# Patient Record
Sex: MALE | Race: WHITE | NOT HISPANIC OR LATINO | Employment: STUDENT | URBAN - METROPOLITAN AREA
[De-identification: names, ages, dates, MRNs, and addresses within clinical notes are randomized per-mention and may not be internally consistent; named-entity substitution may affect disease eponyms.]

---

## 2021-06-04 ENCOUNTER — IMMUNIZATION (OUTPATIENT)
Dept: FAMILY PLANNING/WOMEN'S HEALTH CLINIC | Facility: IMMUNIZATION CENTER | Age: 36
End: 2021-06-04
Attending: INTERNAL MEDICINE
Payer: MEDICARE

## 2021-06-04 DIAGNOSIS — Z23 ENCOUNTER FOR VACCINATION: Primary | ICD-10-CM

## 2021-06-04 PROCEDURE — 0002A PFIZER SARS-COV-2 VACCINE: CPT | Performed by: NURSE PRACTITIONER

## 2021-06-04 PROCEDURE — 91300 PFIZER SARS-COV-2 VACCINE: CPT | Performed by: NURSE PRACTITIONER

## 2022-02-15 ENCOUNTER — OFFICE VISIT (OUTPATIENT)
Dept: SLEEP MEDICINE | Facility: MEDICAL CENTER | Age: 37
End: 2022-02-15
Payer: MEDICARE

## 2022-02-15 VITALS
WEIGHT: 177 LBS | BODY MASS INDEX: 26.83 KG/M2 | SYSTOLIC BLOOD PRESSURE: 146 MMHG | DIASTOLIC BLOOD PRESSURE: 82 MMHG | HEIGHT: 68 IN | HEART RATE: 94 BPM | OXYGEN SATURATION: 98 % | RESPIRATION RATE: 16 BRPM

## 2022-02-15 DIAGNOSIS — G47.30 SLEEP DISORDER BREATHING: ICD-10-CM

## 2022-02-15 DIAGNOSIS — G47.19 EXCESSIVE DAYTIME SLEEPINESS: ICD-10-CM

## 2022-02-15 DIAGNOSIS — G47.419 NARCOLEPSY WITHOUT CATAPLEXY: Primary | ICD-10-CM

## 2022-02-15 PROBLEM — I31.9 CHRONIC PERICARDITIS: Status: ACTIVE | Noted: 2022-02-15

## 2022-02-15 PROBLEM — K21.9 GASTROESOPHAGEAL REFLUX: Status: ACTIVE | Noted: 2022-02-15

## 2022-02-15 PROBLEM — G80.1 SPASTIC CEREBRAL PALSY (HCC): Status: ACTIVE | Noted: 2022-02-15

## 2022-02-15 PROCEDURE — 99204 OFFICE O/P NEW MOD 45 MIN: CPT | Performed by: STUDENT IN AN ORGANIZED HEALTH CARE EDUCATION/TRAINING PROGRAM

## 2022-02-15 RX ORDER — OXYCODONE AND ACETAMINOPHEN 7.5; 325 MG/1; MG/1
TABLET ORAL
COMMUNITY
Start: 2022-01-18

## 2022-02-15 ASSESSMENT — PATIENT HEALTH QUESTIONNAIRE - PHQ9: CLINICAL INTERPRETATION OF PHQ2 SCORE: 0

## 2022-02-15 NOTE — PROGRESS NOTES
[facility] Consult Note     Date: 2/15/2022 / Time: 2:15 PM      HISTORY OF PRESENT ILLNESS:     Donato Valle is a 36 y.o. 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  Presents to Sleep Clinic to establish care for management of narcolepsy without cataplexy and to discuss difficulty breathing at night.    He underwent a PSG and MSLT at outside facility with Dr. Townsend approximately 5 to 6 years ago.  At that time he was told he met the criteria for narcolepsy without cataplexy.  He was started on Nuvigil and Adderall which greatly improved his daytime sleepiness.  He was not told he had sleep apnea at that time.  He has had difficulty with pericarditis in the past and follows with cardiology.  He is on 24-hour supplemental oxygen 2 L/min due to his cardiac history.    He was previously cared for through Neeraj Babin's office who has been managing his narcolepsy without cataplexy.  Due to insurance changes their office was no longer taking insurance.    He is currently taking Adderall 12.5 mg 3 times daily and Nuvigil 250 mg daily.  With this regimen he finds his sleepiness is greatly improved.  He will have a sensation that his medication is wearing off around mid to late afternoon approximately 5 to 6 hours after taking medication.  He does not nap during the day.  Denies any drowsiness while driving while on medication.    He does wake up approximately 1-2 times a month with sleep paralysis.    His sleep schedule does vary some with his bedtime usually between 10 and midnight and his wake time varies between 8 AM and 11 AM.  Feels he cannot nap as he has trouble with awakening from a nap.  In the mornings he has trouble waking up on his own.    One of his main concerns is over the last several months he has noticed he will awaken at  "night and his oxygen will be off.  When he awakens at these episodes he will gasp for air as he feels he is not getting enough air.  He has been told in the past that he has a slight snore in his sleep.  He lives alone and does not share the bed with anyone to tell him any updated information regarding behaviors in sleep.    He does take chronic opioid medication due to pain.    As per supplemental questionnaire to be scanned or imported into chart:    Franklin Sleepiness Score: 9    Sleep Schedule  Bedtime: Weekday & Weekend 10-12am   Wake time: Weekday & Weekend 8-11am   Sleep-onset latency: 30 min to 1hr   Awakenings from sleep: 0-1 sometimes has to use restroom  Difficulty falling back asleep: none   Bedroom partner: none   Naps: No scared if naps will not wake up for hours     DAYTIME SYMPTOMS:   Excessive daytime sleepiness: Yes sometimes   Daytime fatigue: Yes  Difficulty concentrating: Yes  Memory problems: Yes  Irritability:Yes  Work/school performance issues: No   Sleepiness with driving: No   Caffeine/stimulant use: Yes coffee in morning 1-2 cups in morning   Alcohol use:No     SLEEP RELATED SYMPTOMS  Snoring: No , has been told snores slightly in the past.   Witnessed apnea or gasping/choking: Yes gasping without oxygen  Dry mouth or mouth breathing: No   Sweating: Yes  Teeth grinding/biting: No   Morning headaches: Yes  Refreshed Upon Awakening: No      SLEEP RELATED BEHAVIORS:  Parasomnias (walking, talking, eating, violence): No   Leg kicking: No   Restless legs - \"urge to move\": No   Nightmares: No  Recurrent: No   Dream enactment: No      NARCOLEPSY:  Cataplexy: No   Sleep paralysis: Yes 1-2 times a month   Sleep attacks: Yes with cleaning house wakes up on floor   Hypnagogic/hypnopompic hallucinations: No     MEDICAL HISTORY  Past Medical History:   Diagnosis Date   • ASTHMA    • Cerebral palsy (HCC)    • GERD (gastroesophageal reflux disease)    • Narcolepsy    • Pain     cerebral palsy,   • " Unspecified disorder of thyroid         SURGICAL HISTORY  Past Surgical History:   Procedure Laterality Date   • PUMP INSERT/REMOVE Left 10/7/2015    Procedure: PUMP INSERT--Baclofen;  Surgeon: Yifan Sinclair;  Location: SURGERY [facility];  Service:    • NEURO DEST FACET C/T W/IG SNGL Left 2015    Procedure: LEFT T11-12 FACET JOINT NERVE ABLATIONFLUORO, PRONE, RF PROTOCOL;  Surgeon: Gino Gan D.O.;  Location: SURGERY [facility];  Service: Pain Management   • NEURO DEST FACET C/T W/IG ADDL  2015    Procedure: NEURO DEST FACET C/T W/IG ADDL;  Surgeon: Gino Gan D.O.;  Location: SURGERY [facility];  Service: Pain Management   • NEURO DEST FACET L/S W/IG SNGL  2015    Procedure: NEURO DEST FACET L/S W/IG SNGL;  Surgeon: Gino Gan D.O.;  Location: SURGERY [facility];  Service: Pain Management        FAMILY HISTORY  Family History   Problem Relation Age of Onset   • Sleep Apnea Maternal Uncle        SOCIAL HISTORY  Social History     Socioeconomic History   • Marital status: Single     Spouse name: Not on file   • Number of children: Not on file   • Years of education: Not on file   • Highest education level: Not on file   Occupational History   • Not on file   Tobacco Use   • Smoking status: Former Smoker     Packs/day: 0.50     Years: 14.00     Pack years: 7.00     Types: Cigarettes     Quit date: 2008     Years since quittin.1   • Smokeless tobacco: Former User     Quit date: 2015   Vaping Use   • Vaping Use: Never used   Substance and Sexual Activity   • Alcohol use: Not Currently     Alcohol/week: 1.8 oz     Types: 3 Standard drinks or equivalent per week   • Drug use: Yes     Types: Marijuana, Inhaled     Comment: daily medical marijiana   • Sexual activity: Not on file   Other Topics Concern   • Not on file   Social History Narrative   • Not on file     Social Determinants of Health     Financial Resource Strain:    • Difficulty of Paying  Living Expenses: Not on file   Food Insecurity:    • Worried About Running Out of Food in the Last Year: Not on file   • Ran Out of Food in the Last Year: Not on file   Transportation Needs:    • Lack of Transportation (Medical): Not on file   • Lack of Transportation (Non-Medical): Not on file   Physical Activity:    • Days of Exercise per Week: Not on file   • Minutes of Exercise per Session: Not on file   Stress:    • Feeling of Stress : Not on file   Social Connections:    • Frequency of Communication with Friends and Family: Not on file   • Frequency of Social Gatherings with Friends and Family: Not on file   • Attends Sikhism Services: Not on file   • Active Member of Clubs or Organizations: Not on file   • Attends Club or Organization Meetings: Not on file   • Marital Status: Not on file   Intimate Partner Violence:    • Fear of Current or Ex-Partner: Not on file   • Emotionally Abused: Not on file   • Physically Abused: Not on file   • Sexually Abused: Not on file   Housing Stability:    • Unable to Pay for Housing in the Last Year: Not on file   • Number of Places Lived in the Last Year: Not on file   • Unstable Housing in the Last Year: Not on file        Occupation: [employer]     CURRENT MEDICATIONS  Current Outpatient Medications   Medication Sig Dispense Refill   • ondansetron (ZOFRAN ODT) 4 MG TABLET DISPERSIBLE Take 4 mg by mouth every 8 hours as needed for Nausea/Vomiting.     • NON SPECIFIED Oxygen 2L N/C at noc     • pantoprazole (PROTONIX) 40 MG Tablet Delayed Response Take 40 mg by mouth every day.     • Albuterol Sulfate (PROAIR HFA INH) Inhale  by mouth as needed.     • lidocaine (XYLOCAINE) 5 % Ointment Apply  to affected area(s) as needed.     • diclofenac epolamine (FLECTOR) 1.3 % Patch Apply 1 Patch to skin as directed as needed.     • testosterone (TESTIM,ANDROGEL) 50 MG/5GM (1%) GEL gel Apply 5 g to skin as directed 2 Times a Day.     • levothyroxine (SYNTHROID) 100 MCG TABS Take 100  "mcg by mouth Every morning on an empty stomach.     • hydrocodone/acetaminophen (NORCO)  MG TABS Take 1-2 Tabs by mouth every 6 hours as needed.     • methocarbamol (ROBAXIN) 750 MG TABS Take 750 mg by mouth 3 times a day.     • gabapentin (NEURONTIN) 300 MG CAPS Take 300 mg by mouth 3 times a day.     • Armodafinil (NUVIGIL) 200 MG TABS Take 250 mg by mouth every day.     • vitamin D, Ergocalciferol, (DRISDOL) 54339 UNITS CAPS capsule Take  by mouth every 7 days.     • amphetamine-dextroamphetamine (ADDERALL) 20 MG TABS Take 20 mg by mouth 3 times a day.     • ranitidine (ZANTAC) 150 MG TABS Take 150 mg by mouth 2 times a day. (Patient not taking: Reported on 2/15/2022)       No current facility-administered medications for this visit.       REVIEW OF SYSTEMS  Constitutional: Denies fevers, Denies weight changes  Ears/Nose/Throat/Mouth: Denies nasal congestion or sore throat   Cardiovascular: Denies chest pain  Respiratory: Denies shortness of breath, Denies cough  Gastrointestinal/Hepatic: Denies nausea, vomiting  Sleep: see HPI    Physical Examination:  Vitals/ General Appearance:   Weight/BMI: Body mass index is 26.91 kg/m².  /82 (BP Location: Right arm, Patient Position: Sitting, BP Cuff Size: Adult)   Pulse 94   Resp 16   Ht 1.727 m (5' 8\")   Wt 80.3 kg (177 lb)   SpO2 98%   Vitals:    02/15/22 1404   BP: 146/82   BP Location: Right arm   Patient Position: Sitting   BP Cuff Size: Adult   Pulse: 94   Resp: 16   SpO2: 98%   Weight: 80.3 kg (177 lb)   Height: 1.727 m (5' 8\")       Pt. is alert and oriented to time, place and person. Cooperative and in no apparent distress.     Constitutional: Alert, no distress, well-groomed.  Skin: No rashes in visible areas.  Eye: Round. Conjunctiva clear, lids normal. No icterus.   ENT EXAM  Nasal alae/valves collapsible: No   Nasal septum deviation: Yes  Nasal turbinate hypertrophy: Left: Grade 1   Right: Grade 1  Hard palate narrow: No   Hard palate high: No "   Soft palate/uvula (Mallampati score): 3  Tongue Scalloping: Yes  Retrognathia: No   Micrognathia: No   Cardiovascular:no murmus/gallops/rubs, normal S1 and S2 heart sounds, regular rate and rhythm  Pulmonary:Clear to auscultation, No wheezes, No crackles.  Neurologic:Awake, alert and oriented x 3, No involuntary motions.  Extremities: No clubbing, cyanosis, or edema       ASSESSMENT AND PLAN   Donato Valle is a 36 y.o. 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  Presents to Sleep Clinic to establish care for management of narcolepsy without cataplexy and to discuss difficulty breathing at night.    1. Donato Valle  has symptoms of Sleep Apnea. Donato Valle has symptoms of snoring, gasping during sleep, morning headaches, unrefreshed upon awakening. These  interfere with activities of daily living.     Pt has risk factors for sleep apnea include chronic opioid medication, narcolepsy, and crowded oropharynx.       The pathophysiology of JENNIFER and the increased risk of cardiovascular morbidity from untreated JENNIFER is discussed in detail with the patient. He  also has excessive daytime sleepiness, GERD, chronic pain which can be worsened by JENNIFER.      We have discussed diagnostic options including in-laboratory, attended polysomnography and home sleep testing. We have also discussed treatment options including airway pressurization, reconstructive otolaryngologic surgery, dental appliances and weight management.     Subsequently,treatment options will be discussed based on the diagnostic study. Meanwhile, He is urged to avoid supine sleep, weight gain and alcoholic beverages since all of these can worsen JENNIFER. He is cautioned against drowsy driving. If He feels sleepy while driving, advised must pull over for a break/nap, rather than persist on  the road, in the interest of Pt's own safety and that of others on the road.    Plan  -  He  will be scheduled for an overnight PSG to assess sleep related breathing disorder.  - Follow up 1-2 weeks after sleep study to discuss results and treatment options moving forward   -Advised to reach out via Memamphart or by phone with any questions or concerns.     2.  Narcolepsy without cataplexy  Reports diagnosis occurred 5 to 6 years ago at outside facility Dr. Townsend's office.  Release information signed to try to get records from previous practitioner who is managing his narcolepsy without cataplexy Neeraj Babin.  We do not have records of previous MSLT or PSG testing.  Currently taking 12.5 mg Adderall 3 times a day and Nuvigil 250 mg daily.    Plan  -We will schedule MSLT to be done 2 weeks after stopping stimulant medication  -Await records from outside facility  -Encouraged to keep regular sleep schedule with same wake time and bedtime.  -Advised to avoid driving if drowsy if too sleepy to drive avoid driving.  Recommended if he notes he has longer drive may take a short nap prior to driving  -May consider taking 20 to 30-minute naps during the day when off medication to help with sleepiness  -Discussed importance of knowing limitations in terms of sleepiness when not medicated.  -Advised to reach out via Memamphart with any questions    Regarding treatment of other past medical problems and general health maintenance,  Pt is urged to follow up with PCP.      Please note portions of this record was created using voice recognition software. I have made every reasonable attempt to correct obvious errors, but I expect that there are errors of grammar and possibly content I did not discover before finalizing the note.

## 2022-03-22 ENCOUNTER — SLEEP STUDY (OUTPATIENT)
Dept: SLEEP MEDICINE | Facility: MEDICAL CENTER | Age: 37
End: 2022-03-22
Attending: STUDENT IN AN ORGANIZED HEALTH CARE EDUCATION/TRAINING PROGRAM
Payer: MEDICARE

## 2022-03-22 DIAGNOSIS — G47.30 SLEEP DISORDER BREATHING: ICD-10-CM

## 2022-03-22 DIAGNOSIS — G47.419 NARCOLEPSY WITHOUT CATAPLEXY: ICD-10-CM

## 2022-03-22 DIAGNOSIS — G47.19 EXCESSIVE DAYTIME SLEEPINESS: ICD-10-CM

## 2022-03-22 PROCEDURE — 95810 POLYSOM 6/> YRS 4/> PARAM: CPT | Performed by: STUDENT IN AN ORGANIZED HEALTH CARE EDUCATION/TRAINING PROGRAM

## 2022-03-23 ENCOUNTER — SLEEP STUDY (OUTPATIENT)
Dept: SLEEP MEDICINE | Facility: MEDICAL CENTER | Age: 37
End: 2022-03-23
Attending: STUDENT IN AN ORGANIZED HEALTH CARE EDUCATION/TRAINING PROGRAM
Payer: MEDICARE

## 2022-03-23 DIAGNOSIS — G47.419 NARCOLEPSY WITHOUT CATAPLEXY: ICD-10-CM

## 2022-03-23 DIAGNOSIS — G47.19 EXCESSIVE DAYTIME SLEEPINESS: ICD-10-CM

## 2022-03-23 DIAGNOSIS — G47.30 SLEEP DISORDER BREATHING: ICD-10-CM

## 2022-03-23 PROCEDURE — 95805 MULTIPLE SLEEP LATENCY TEST: CPT | Performed by: STUDENT IN AN ORGANIZED HEALTH CARE EDUCATION/TRAINING PROGRAM

## 2022-03-24 NOTE — PROCEDURES
MONTAGE: Standard  STUDY TYPE: Diagnostic    RECORDING TECHNIQUE:   After the scalp was prepared, gold plated electrodes were applied to the scalp according to the International 10-20 System. EEG (electroencephalogram) was continuously monitored from the O1-M2, O2-M1, C3-M2, C4-M1, F3-M2, and F4-M1. EOGs (electrooculograms) were monitored by electrodes placed at the left and right outer canthi. Chin EMG (electromyogram) was monitored by electrodes placed on the mentalis and sub-mentalis muscles. Nasal and oral airflow were monitored using a triple port thermocouple as well as oronasal pressure transducer. Respiratory effort was measured by inductive plethysmography technology employing abdominal and thoracic belts. Blood oxygen saturation and pulse were monitored by pulse oximetry. Heart rhythm was monitored by surface electrocardiogram. Leg EMG was studied using surface electrodes placed on left and right anterior tibialis. A microphone was used to monitor tracheal sounds and snoring. Body position was monitored and documented by technician observation.     SCORING CRITERIA:   A modification of the AASM manual for scoring of sleep and associated events was used. Obstructive apneas were scored by cessation of airflow for at least 10 seconds with continuing respiratory effort. Central apneas were scored by cessation of airflow for at least 10 seconds with no respiratory effort. Hypopneas were scored by a 30% or more reduction in airflow for at least 10 seconds accompanied by arterial oxygen desaturation of 3% or an arousal. For CMS (Medicare) patients, per AASM rule 1B, hypopneas are scored by 30% with mild reduction in airflow for at least 10 seconds accompanied by arterial saturation decreased at 4%.    Study start time was 11:02:55 PM. Diagnostic recording time was 7h 50.5m with a total sleep time of 6h 17.5m resulting in a sleep efficiency of 80.23%%. Sleep latency from the start of the study was 10 minutes and  the latency from sleep to REM was 12 minutes. In total,66 arousals were scored for an arousal index of 10.5.    Respiratory:  There were a total of 11 apneas consisting of 2 obstructive apneas, 0 mixed apneas, and 9 central apneas. A total of 24 hypopneas were scored. The apnea index was 1.75 per hour and the hypopnea index was 3.81 per hour resulting in an overall AHI of 5.56. AHI during rem was 7.4 and AHI while supine was 0.00.  Oximetry:  There was a mean oxygen saturation of 96.0%. The minimum oxygen saturation in NREM was 86.0 % and in REM was 91.0%. The patient spent 18.7 minutes of TST with SaO2 <88%.  Cardiac:  The highest heart rate seen while awake was 82 BPM while the highest heart rate during sleep was 80 BPM with an average sleeping heart rate of 60 BPM.  Limb Movements:  There were a total of 89 PLMs during sleep which resulted in a PLMS index of 14.1. Of these, 10 were associated with arousals which resulted in a PLMS arousal index of 1.6.    Impression:  1.  Mild obstructive sleep apnea overall AHI of 5.6   2.  Nocturnal hypoxia likely secondary to untreated sleep apnea minimum oxygen saturation of 86% time below or at 88% saturation of 18.7 minutes  3.  Sleep latency was 10 minutes and REM latency was 12 minutes, meets criteria for SOREM  4.  PLM's noted    Recommendations:  I recommend that the patient should return for a CPAP/BiPAP titration.  May consider empiric CPAP therapy.      In some cases alternative treatment options may be proven effective in resolving sleep apnea. These options include upper airway surgery, the use of a dental orthotic, weight loss orpositional therapy. Clinical correlation is required. In general patients with sleep apnea are advised to avoid alcohol, sedatives and not to operate a motor vehicle while drowsy.  Untreated sleep apnea increases the risk for cardiovascular and neurovascular disease.

## 2022-04-06 NOTE — PROCEDURES
Mean sleep latency testing        Recording Technique:  EEG was continuously moniotred from O1-M2,O2-M1,C3-M2,C3-M2,C4-M1,F3-M2 AND F4-M1. The EOGs and EMG was monitored, heart rhythm was monitored by EKG.      Scoring  Nap1: Sleep latency: 3.5 min, yes SOREMP  Nap2: Sleep latency: 1.0 min, yes SOREMP  Nap3: Sleep latency: 2.0 min, yes SOREMP  Nap4: Sleep latency: 1.5 min, No SOREMP  Nap5: Sleep latency: 3.0 min, No SOREMP    Mean Sleep latency: 2.2 min      Total # of SOREMPs: 3       Interpretation:    PS) Mild obstructive sleep apnea (JENNIFER) with apnea+hypopnea index (AHI) of 5.6/hour. The O2 sat luan was 86%.  Sleep latency 10 minutes, sleep onset REM period present with a latency of 12.5 minutes.    MSLT  1. The Multiple Sleep Latency Test (MSLT) was done on the next day with 5 naps. There was evidence of hypersomnolence with a short, 5-nap mean sleep latency of 2.2 min.    3 sleep onset REM periods (SOREMPs) were seen to suggest narcolepsy.      Further clinical correlation is recommended.         Patient did not submit a sleep log for the two weeks preceding the MSLT.      Urine drug screen on the morning of the MSLT was not done prior to MSLT.     Patient meets diagnostic criteria for narcolepsy with a mean sleep latency of 2.2 minutes.  In addition had 3 sleep onset REM periods during MSLT testing and 1 sleep onset REM period during prior night PSG testing.  Patient did meet criteria for mild obstructive sleep apnea however this should have very minimal impact sleep latency and sleep onset REM periods during MSLT testing.

## 2022-04-08 ENCOUNTER — OFFICE VISIT (OUTPATIENT)
Dept: SLEEP MEDICINE | Facility: MEDICAL CENTER | Age: 37
End: 2022-04-08
Payer: MEDICARE

## 2022-04-08 VITALS
WEIGHT: 176 LBS | HEART RATE: 94 BPM | OXYGEN SATURATION: 97 % | SYSTOLIC BLOOD PRESSURE: 122 MMHG | BODY MASS INDEX: 26.67 KG/M2 | DIASTOLIC BLOOD PRESSURE: 82 MMHG | RESPIRATION RATE: 14 BRPM | HEIGHT: 68 IN

## 2022-04-08 DIAGNOSIS — G47.419 NARCOLEPSY WITHOUT CATAPLEXY: Primary | ICD-10-CM

## 2022-04-08 DIAGNOSIS — G47.33 OSA (OBSTRUCTIVE SLEEP APNEA): ICD-10-CM

## 2022-04-08 PROCEDURE — 99214 OFFICE O/P EST MOD 30 MIN: CPT | Performed by: STUDENT IN AN ORGANIZED HEALTH CARE EDUCATION/TRAINING PROGRAM

## 2022-04-08 RX ORDER — DEXTROAMPHETAMINE SACCHARATE, AMPHETAMINE ASPARTATE, DEXTROAMPHETAMINE SULFATE AND AMPHETAMINE SULFATE 3.125; 3.125; 3.125; 3.125 MG/1; MG/1; MG/1; MG/1
12.5 TABLET ORAL 3 TIMES DAILY PRN
Qty: 90 TABLET | Refills: 0 | Status: SHIPPED | OUTPATIENT
Start: 2022-05-03 | End: 2022-06-03 | Stop reason: SDUPTHER

## 2022-04-08 RX ORDER — ARMODAFINIL 200 MG/1
250 TABLET ORAL DAILY
Qty: 30 TABLET | Refills: 0 | Status: SHIPPED | OUTPATIENT
Start: 2022-04-08 | End: 2022-04-11

## 2022-04-08 NOTE — PROGRESS NOTES
[facility] Follow-up Visit    CC: Follow-up to discuss sleep study results      HPI:  Donato Valle is a 36 y.o.male  with Cerebral palsy, hypothyroidism, chronic pain, GERD, history of pericarditis, chronic respiratory failure into the supplemental oxygen, narcolepsy type II, and mild obstructive sleep apnea.  Presents today to discuss sleep study results.    He was able to go off his medications prior to PSG MSLT testing.  Continues to use supplemental oxygen 24 hours 2 L/min.    Continues to take Adderall 12.5 mg 3 times a day along with Nuvigil 250 mg daily.  With this regimen he finds his sleepiness is well controlled.  Continues to deny any cataplectic episodes.    Sleep History  Previously diagnosed outside facility Dr. Townsend's office around 2015 2016 which showed narcolepsy without cataplexy.  3/22/2022 PSG showed mild obstructive sleep apnea overall AHI of 5.6, minimum oxygen saturation 86%, time at or below 88% saturation of 18.7 minutes.  Sleep onset REM period was seen during PSG  3/23/2022 MSLT showed mean sleep latency of 2.2 minutes and 3 sleep onset REM periods were identified consistent with a diagnosis of narcolepsy.    Patient Active Problem List    Diagnosis Date Noted   • Chronic pericarditis 02/15/2022   • Gastroesophageal reflux 02/15/2022   • Spastic cerebral palsy (HCC) 02/15/2022   • Chronic pain 10/07/2015   • Lumbosacral spondylosis without myelopathy 05/08/2015       Past Medical History:   Diagnosis Date   • ASTHMA    • Cerebral palsy (HCC)    • GERD (gastroesophageal reflux disease)    • Narcolepsy    • Pain     cerebral palsy,   • Unspecified disorder of thyroid         Past Surgical History:   Procedure Laterality Date   • PUMP INSERT/REMOVE Left 10/7/2015    Procedure: PUMP INSERT--Baclofen;  Surgeon: Yifan Sinclair;  Location: SURGERY [facility];  Service:    • NEURO DEST FACET C/T W/IG SNGL Left 5/8/2015    Procedure: LEFT T11-12 FACET JOINT NERVE  ABLATIONFLUORO, PRONE, RF PROTOCOL;  Surgeon: Gino Gan D.O.;  Location: [facility];  Service: Pain Management   • NEURO DEST FACET C/T W/IG ADDL  2015    Procedure: NEURO DEST FACET C/T W/IG ADDL;  Surgeon: Gino Gan D.O.;  Location: [facility];  Service: Pain Management   • NEURO DEST FACET L/S W/IG SNGL  2015    Procedure: NEURO DEST FACET L/S W/IG SNGL;  Surgeon: Gino Gan D.O.;  Location: [facility];  Service: Pain Management       Family History   Problem Relation Age of Onset   • Sleep Apnea Maternal Uncle        Social History     Socioeconomic History   • Marital status: Single     Spouse name: Not on file   • Number of children: Not on file   • Years of education: Not on file   • Highest education level: Not on file   Occupational History   • Not on file   Tobacco Use   • Smoking status: Former Smoker     Packs/day: 0.50     Years: 14.00     Pack years: 7.00     Types: Cigarettes     Quit date: 2008     Years since quittin.2   • Smokeless tobacco: Former User     Quit date: 2015   Vaping Use   • Vaping Use: Never used   Substance and Sexual Activity   • Alcohol use: Not Currently     Alcohol/week: 1.8 oz     Types: 3 Standard drinks or equivalent per week   • Drug use: Yes     Types: Marijuana, Inhaled     Comment: daily medical marijiana   • Sexual activity: Not on file   Other Topics Concern   • Not on file   Social History Narrative   • Not on file     Social Determinants of Health     Financial Resource Strain: Not on file   Food Insecurity: Not on file   Transportation Needs: Not on file   Physical Activity: Not on file   Stress: Not on file   Social Connections: Not on file   Intimate Partner Violence: Not on file   Housing Stability: Not on file       Current Outpatient Medications   Medication Sig Dispense Refill   • [START ON 5/3/2022] amphetamine-dextroamphetamine (ADDERALL) 12.5 MG tablet Take 1 Tablet  "by mouth 3 times a day as needed (sleepiness) for up to 61 days. 90 Tablet 0   • Armodafinil 200 MG Tab Take 250 mg by mouth every day for 30 days. 30 Tablet 0   • oxyCODONE-acetaminophen (PERCOCET) 7.5-325 MG per tablet TAKE 1 ORAL TABLET FOUR TIMES A DAY. DNF UNTIL 01/18/2022- #120 FOR 30 DAYS     • ondansetron (ZOFRAN ODT) 4 MG TABLET DISPERSIBLE Take 4 mg by mouth every 8 hours as needed for Nausea/Vomiting.     • NON SPECIFIED Oxygen 2L N/C at noc     • pantoprazole (PROTONIX) 40 MG Tablet Delayed Response Take 40 mg by mouth every day.     • Albuterol Sulfate (PROAIR HFA INH) Inhale  by mouth as needed.     • lidocaine (XYLOCAINE) 5 % Ointment Apply  to affected area(s) as needed.     • testosterone (TESTIM,ANDROGEL) 50 MG/5GM (1%) GEL gel Apply 5 g to skin as directed 2 Times a Day.     • levothyroxine (SYNTHROID) 100 MCG TABS Take 100 mcg by mouth Every morning on an empty stomach.     • methocarbamol (ROBAXIN) 750 MG TABS Take 750 mg by mouth 3 times a day.     • gabapentin (NEURONTIN) 300 MG CAPS Take 300 mg by mouth 3 times a day.     • vitamin D, Ergocalciferol, (DRISDOL) 53384 UNITS CAPS capsule Take  by mouth every 7 days.       No current facility-administered medications for this visit.        ALLERGIES: Patient has no known allergies.    ROS  Constitutional: Denies fevers, Denies weight changes  Ears/Nose/Throat/Mouth: Denies nasal congestion or sore throat   Cardiovascular: Denies chest pain  Respiratory: Denies shortness of breath, Denies cough  Gastrointestinal/Hepatic: Denies nausea, vomiting  Sleep: see HPI      PHYSICAL EXAM  /82 (BP Location: Left arm, Patient Position: Sitting, BP Cuff Size: Adult)   Pulse 94   Resp 14   Ht 1.727 m (5' 8\")   Wt 79.8 kg (176 lb)   SpO2 97%   BMI 26.76 kg/m²   Appearance: Well-nourished, well-developed, no acute distress  Eyes:  No scleral icterus , EOMI  ENMT: masked  Musculoskeletal:  Grossly normal; gait and station normal; digits and nails " normal  Skin:  No rashes, petechiae, cyanosis  Neurologic: without focal signs; oriented to person, time, place, and purpose; judgement intact      Medical Decision Making   Assessment and Plan  Donato Valle is a 36 y.o.male  with Cerebral palsy, hypothyroidism, chronic pain, GERD, history of pericarditis, chronic respiratory failure into the supplemental oxygen, narcolepsy type II, and mild obstructive sleep apnea.  Presents today to discuss sleep study results.    The medical record was reviewed.    Obstructive sleep apnea   Reviewed recent PSG with patient showing an AHI of 5.6 and Min Oxygen saturation of 86%.  Time at or below 80% saturation of 18.7 minutes  Based on sleep study and symptoms meets criteria for mild obstructive sleep apnea.   We discussed the pathophysiology of obstructive sleep apnea (JENNIFER) and risk factors for the disease. We also discussed possible consequences of untreated JENNIFER, including excessive daytime sleepiness and fatigue, cognitive dysfunction, cardiovascular complications such as elevated blood pressure, heart attacks, cardiac arrhythmias, and strokes. We discussed how JENNIFER typically gets worse with age. We discussed treatment options for JENNIFER, including the gold standard therapy (PAP), alternative options such as a mandibular advancement device (custom-made oral appliances) and surgeries. We will proceed CPAP therapy.     RECOMMENDATIONS  -Start Auto CPAP at pressures 4-7 cm H2O  -Discussed importance of adherence/compliance   -Prescription generated for supplies   -Patient counseled to avoid driving when sleepy. Encouraged to anticipate sleepiness, consider taking a 10 min nap prior to driving, alternate with another , or pull over if sleepy while driving  -Advised to contact our office or myself with any questions via [email or web address]  -Follow up in 3 months or sooner if needed    Positive airway pressure will favorably impact many of the adverse conditions and effects provoked  by JENNIFER.    Narcolepsy type II  Reviewed recent PSG and MSLT results with patient showing findings consistent with narcolepsy with a mean sleep latency of 2.2 minutes and more than 2 sleep onset REM periods.  Symptoms have been previously well controlled with Adderall and Nuvigil.  We will plan to continue current regimen of medications.    Plan  -Continue Adderall 12.5 mg 3 times daily, renewed today to start in May  -Continue Nuvigil 250 mg daily, renewed today   -Advised to avoid driving if drowsy  -Advised to reach out via [email or web address] with any questions    Have advised the patient to follow up with the appropriate healthcare practitioners for all other medical problems and issues.    Return for 1-2 months after starting CPAP. .      Please note portions of this record was created using voice recognition software. I have made every reasonable attempt to correct obvious errors, but I expect that there are errors of grammar and possibly content I did not discover before finalizing the note.

## 2022-04-08 NOTE — PATIENT INSTRUCTIONS
"CPAP and BPAP Information  CPAP and BPAP are methods of helping a person breathe with the use of air pressure. CPAP stands for \"continuous positive airway pressure.\" BPAP stands for \"bi-level positive airway pressure.\" In both methods, air is blown through your nose or mouth and into your air passages to help you breathe well.  CPAP and BPAP use different amounts of pressure to blow air. With CPAP, the amount of pressure stays the same while you breathe in and out. With BPAP, the amount of pressure is increased when you breathe in (inhale) so that you can take larger breaths. Your health care provider will recommend whether CPAP or BPAP would be more helpful for you.  Why are CPAP and BPAP treatments used?  CPAP or BPAP can be helpful if you have:  · Sleep apnea.  · Chronic obstructive pulmonary disease (COPD).  · Heart failure.  · Medical conditions that weaken the muscles of the chest including muscular dystrophy, or neurological diseases such as amyotrophic lateral sclerosis (ALS).  · Other problems that cause breathing to be weak, abnormal, or difficult.  CPAP is most commonly used for obstructive sleep apnea (JENNIFER) to keep the airways from collapsing when the muscles relax during sleep.  How is CPAP or BPAP administered?  Both CPAP and BPAP are provided by a small machine with a flexible plastic tube that attaches to a plastic mask. You wear the mask. Air is blown through the mask into your nose or mouth. The amount of pressure that is used to blow the air can be adjusted on the machine. Your health care provider will determine the pressure setting that should be used based on your individual needs.  When should CPAP or BPAP be used?  In most cases, the mask only needs to be worn during sleep. Generally, the mask needs to be worn throughout the night and during any daytime naps. People with certain medical conditions may also need to wear the mask at other times when they are awake. Follow instructions from your " health care provider about when to use the machine.  What are some tips for using the mask?    · Because the mask needs to be snug, some people feel trapped or closed-in (claustrophobic) when first using the mask. If you feel this way, you may need to get used to the mask. One way to do this is by holding the mask loosely over your nose or mouth and then gradually applying the mask more snugly. You can also gradually increase the amount of time that you use the mask.  · Masks are available in various types and sizes. Some fit over your mouth and nose while others fit over just your nose. If your mask does not fit well, talk with your health care provider about getting a different one.  · If you are using a mask that fits over your nose and you tend to breathe through your mouth, a chin strap may be applied to help keep your mouth closed.  · The CPAP and BPAP machines have alarms that may sound if the mask comes off or develops a leak.  · If you have trouble with the mask, it is very important that you talk with your health care provider about finding a way to make the mask easier to tolerate. Do not stop using the mask. Stopping the use of the mask could have a negative impact on your health.  What are some tips for using the machine?  · Place your CPAP or BPAP machine on a secure table or stand near an electrical outlet.  · Know where the on/off switch is located on the machine.  · Follow instructions from your health care provider about how to set the pressure on your machine and when you should use it.  · Do not eat or drink while the CPAP or BPAP machine is on. Food or fluids could get pushed into your lungs by the pressure of the CPAP or BPAP.  · Do not smoke. Tobacco smoke residue can damage the machine.  · For home use, CPAP and BPAP machines can be rented or purchased through home health care companies. Many different brands of machines are available. Renting a machine before purchasing may help you find out  which particular machine works well for you.  · Keep the CPAP or BPAP machine and attachments clean. Ask your health care provider for specific instructions.  Get help right away if:  · You have redness or open areas around your nose or mouth where the mask fits.  · You have trouble using the CPAP or BPAP machine.  · You cannot tolerate wearing the CPAP or BPAP mask.  · You have pain, discomfort, and bloating in your abdomen.  Summary  · CPAP and BPAP are methods of helping a person breathe with the use of air pressure.  · Both CPAP and BPAP are provided by a small machine with a flexible plastic tube that attaches to a plastic mask.  · If you have trouble with the mask, it is very important that you talk with your health care provider about finding a way to make the mask easier to tolerate.  This information is not intended to replace advice given to you by your health care provider. Make sure you discuss any questions you have with your health care provider.  Document Released: 09/15/2005 Document Revised: 04/08/2020 Document Reviewed: 11/06/2017  Elsevier Patient Education © 2020 [employer]

## 2022-04-11 DIAGNOSIS — G47.419 NARCOLEPSY WITHOUT CATAPLEXY: Primary | ICD-10-CM

## 2022-04-11 RX ORDER — ARMODAFINIL 250 MG/1
250 TABLET ORAL
Qty: 30 TABLET | Refills: 1 | Status: SHIPPED | OUTPATIENT
Start: 2022-04-11 | End: 2022-05-11

## 2022-06-03 ENCOUNTER — PATIENT MESSAGE (OUTPATIENT)
Dept: SLEEP MEDICINE | Facility: MEDICAL CENTER | Age: 37
End: 2022-06-03
Payer: MEDICARE

## 2022-06-03 ENCOUNTER — TELEPHONE (OUTPATIENT)
Dept: SLEEP MEDICINE | Facility: MEDICAL CENTER | Age: 37
End: 2022-06-03

## 2022-06-03 DIAGNOSIS — G47.419 NARCOLEPSY WITHOUT CATAPLEXY: ICD-10-CM

## 2022-06-03 DIAGNOSIS — G47.419 NARCOLEPSY WITHOUT CATAPLEXY: Primary | ICD-10-CM

## 2022-06-03 RX ORDER — DEXTROAMPHETAMINE SACCHARATE, AMPHETAMINE ASPARTATE, DEXTROAMPHETAMINE SULFATE AND AMPHETAMINE SULFATE 3.125; 3.125; 3.125; 3.125 MG/1; MG/1; MG/1; MG/1
12.5 TABLET ORAL 3 TIMES DAILY PRN
Qty: 90 TABLET | Refills: 0 | Status: SHIPPED | OUTPATIENT
Start: 2022-06-03 | End: 2022-07-03 | Stop reason: SDUPTHER

## 2022-06-03 RX ORDER — ARMODAFINIL 250 MG/1
1 TABLET ORAL
Qty: 30 TABLET | Refills: 0 | Status: SHIPPED | OUTPATIENT
Start: 2022-06-03 | End: 2022-07-03 | Stop reason: SDUPTHER

## 2022-06-03 RX ORDER — DEXTROAMPHETAMINE SACCHARATE, AMPHETAMINE ASPARTATE, DEXTROAMPHETAMINE SULFATE AND AMPHETAMINE SULFATE 3.125; 3.125; 3.125; 3.125 MG/1; MG/1; MG/1; MG/1
12.5 TABLET ORAL 3 TIMES DAILY PRN
Qty: 90 TABLET | Refills: 0 | Status: SHIPPED | OUTPATIENT
Start: 2022-06-03 | End: 2022-06-03

## 2022-06-03 NOTE — TELEPHONE ENCOUNTER
Have we ever prescribed this med? Yes.  If yes, what date? Yes, 05/03/22    Last OV: 04/08/22    Next OV: None    DX: Narcolepsy without cataplexy (G47.419)    Medications: amphetamine-dextroamphetamine (ADDERALL) 12.5 MG tablet

## 2022-07-03 DIAGNOSIS — G47.419 NARCOLEPSY WITHOUT CATAPLEXY: ICD-10-CM

## 2022-07-06 RX ORDER — ARMODAFINIL 250 MG/1
1 TABLET ORAL
Qty: 30 TABLET | Refills: 0 | Status: SHIPPED | OUTPATIENT
Start: 2022-07-06 | End: 2022-08-02 | Stop reason: SDUPTHER

## 2022-07-06 RX ORDER — DEXTROAMPHETAMINE SACCHARATE, AMPHETAMINE ASPARTATE, DEXTROAMPHETAMINE SULFATE AND AMPHETAMINE SULFATE 3.125; 3.125; 3.125; 3.125 MG/1; MG/1; MG/1; MG/1
12.5 TABLET ORAL 3 TIMES DAILY PRN
Qty: 90 TABLET | Refills: 0 | Status: SHIPPED | OUTPATIENT
Start: 2022-07-06 | End: 2022-08-02 | Stop reason: SDUPTHER

## 2022-07-06 NOTE — TELEPHONE ENCOUNTER
Have we ever prescribed this med? Yes.  If yes, what date? Yes, 6/3/22    Last OV: 4/8/22    Next OV: None    DX: Narcolepsy without cataplexy (G47.419)    Medications: Armodafinil 250 MG Tab

## 2022-08-02 DIAGNOSIS — G47.419 NARCOLEPSY WITHOUT CATAPLEXY: ICD-10-CM

## 2022-08-02 NOTE — TELEPHONE ENCOUNTER
Have we ever prescribed this med? Yes.  If yes, what date? YES, 07/6/22    Last OV: 04/08/2022    Next OV: 08/04/22    DX: Narcolepsy without cataplexy (G47.419)    Medications: Armodafinil 250 MG Tab    Have we ever prescribed this med? Yes.  If yes, what date? Yes, 07/06/22    Last OV: 04/08/22    Next OV: 08/04/2022    DX: Narcolepsy without cataplexy (G47.419)    Medications: amphetamine-dextroamphetamine (ADDERALL) 12.5 MG tablet

## 2022-08-03 RX ORDER — DEXTROAMPHETAMINE SACCHARATE, AMPHETAMINE ASPARTATE, DEXTROAMPHETAMINE SULFATE AND AMPHETAMINE SULFATE 3.125; 3.125; 3.125; 3.125 MG/1; MG/1; MG/1; MG/1
12.5 TABLET ORAL 3 TIMES DAILY PRN
Qty: 90 TABLET | Refills: 0 | Status: SHIPPED | OUTPATIENT
Start: 2022-08-03 | End: 2022-09-02 | Stop reason: SDUPTHER

## 2022-08-03 RX ORDER — ARMODAFINIL 250 MG/1
1 TABLET ORAL
Qty: 30 TABLET | Refills: 0 | Status: SHIPPED | OUTPATIENT
Start: 2022-08-03 | End: 2022-09-02 | Stop reason: SDUPTHER

## 2022-08-04 ENCOUNTER — OFFICE VISIT (OUTPATIENT)
Dept: SLEEP MEDICINE | Facility: MEDICAL CENTER | Age: 37
End: 2022-08-04
Payer: MEDICARE

## 2022-08-04 VITALS
RESPIRATION RATE: 16 BRPM | DIASTOLIC BLOOD PRESSURE: 84 MMHG | BODY MASS INDEX: 26.07 KG/M2 | HEIGHT: 68 IN | SYSTOLIC BLOOD PRESSURE: 130 MMHG | HEART RATE: 82 BPM | WEIGHT: 172 LBS | OXYGEN SATURATION: 98 %

## 2022-08-04 DIAGNOSIS — G47.33 OSA (OBSTRUCTIVE SLEEP APNEA): ICD-10-CM

## 2022-08-04 DIAGNOSIS — G47.419 NARCOLEPSY WITHOUT CATAPLEXY: ICD-10-CM

## 2022-08-04 DIAGNOSIS — R06.2 WHEEZING: ICD-10-CM

## 2022-08-04 PROCEDURE — 99214 OFFICE O/P EST MOD 30 MIN: CPT | Performed by: NURSE PRACTITIONER

## 2022-08-04 RX ORDER — MOMETASONE FUROATE 100 UG/1
1 AEROSOL RESPIRATORY (INHALATION) 2 TIMES DAILY
Qty: 1 EACH | Refills: 0 | COMMUNITY
Start: 2022-08-04 | End: 2022-09-02 | Stop reason: SDUPTHER

## 2022-08-04 NOTE — PROGRESS NOTES
Chief Complaint   Patient presents with   • Follow-Up     1st compliance check   last seen on 4/8/2022 - Dr. Bhakta  Auto CPAP at pressures 4-7 cm        HPI:  Donato Valle is a 36 y.o. year old male here today for follow-up on JENNIFER and narcolepsy type II.  Patient presents today for first compliance on CPAP machine that was ordered at last visit on 4/8/2022.  Past medical history also includes pericarditis, chronic respiratory failure into the supplemental oxygen, narcolepsy type II, and mild obstructive sleep apnea.     Continues to take Adderall 12.5 mg 3 times a day along with Nuvigil 250 mg daily.  With this regimen he finds his sleepiness is well controlled.  Continues to deny any cataplectic episodes.    Patient is currently using a fullface mask and denies any difficulty with mask fit or pressures at this time.  He denies any problems falling or staying asleep.  As previously mentioned he is on Adderall and Nuvigil.  He states that he is still getting used to have to wear the mask before going to bed.  He is modifying his sleep structure to accommodate for CPAP use.  He denies any excessive daytime sleepiness, morning headaches, palpitations, concentration or memory problems.  Overall on CPAP he feels that his breathing is improved.  He is previously needing to wake up and use his albuterol due to coughing and wheezing.  That has improved significantly with CPAP use.  He is using CPAP with supplemental oxygen bleed in.  CPAP is set to auto CPAP at 4 to 7 cm/H2O.    Compliance was reviewed and does show 80% use with an average time of 7 hours and 8 minutes and a resultant AHI of 3.5 with no evidence of excessive leakage.    Sleep History  Previously diagnosed outside facility Dr. Townsend's office around 2015 2016 which showed narcolepsy without cataplexy.  3/22/2022 PSG showed mild obstructive sleep apnea overall AHI of 5.6, minimum oxygen saturation 86%, time at or below 88% saturation of 18.7 minutes.   Sleep onset REM period was seen during PSG  3/23/2022 MSLT showed mean sleep latency of 2.2 minutes and 3 sleep onset REM periods were identified consistent with a diagnosis of narcolepsy.    Patient also has a previous diagnosis of asthma, but is currently managed for this.  He states his symptoms are occasional cough, but mostly shortness of breath and wheezing.  He is currently only using albuterol and using it 3-4 times a day.  There is no evidence of PFTs to review.  She did have an exacerbation on 7/3/2022 for which she was given methylprednisone and duo nebs which improved his breathing significantly.  He would like to inquire about better management of asthma as he is not currently on a maintenance inhaler.  Is a former smoker who quit 14 years ago with a 7-pack-year history.    ROS: As per HPI and otherwise negative if not stated.    Past Medical History:   Diagnosis Date   • ASTHMA    • Cerebral palsy (HCC)    • GERD (gastroesophageal reflux disease)    • Narcolepsy    • Pain     cerebral palsy,   • Unspecified disorder of thyroid        Past Surgical History:   Procedure Laterality Date   • PUMP INSERT/REMOVE Left 10/7/2015    Procedure: PUMP INSERT--Baclofen;  Surgeon: Yifan Sinclair;  Location[facility];  Service:    • NEURO DEST FACET C/T W/IG SNGL Left 5/8/2015    Procedure: LEFT T11-12 FACET JOINT NERVE ABLATIONFLUORO, PRONE, RF PROTOCOL;  Surgeon: Gino Gan D.O.;  Location: [facility];  Service: Pain Management   • NEURO DEST FACET C/T W/IG ADDL  5/8/2015    Procedure: NEURO DEST FACET C/T W/IG ADDL;  Surgeon: Gino Gan D.O.;  Location: [facility];  Service: Pain Management   • NEURO DEST FACET L/S W/IG SNGL  5/8/2015    Procedure: NEURO DEST FACET L/S W/IG SNGL;  Surgeon: Gino Gan D.O.;  Location: [facility];  Service: Pain Management       Family History   Problem Relation Age of Onset   • Sleep Apnea Maternal  "Uncle        Allergies as of 08/04/2022   • (No Known Allergies)        Vitals:  /84 (BP Location: Left arm, Patient Position: Sitting, BP Cuff Size: Adult)   Pulse 82   Resp 16   Ht 1.727 m (5' 8\")   Wt 78 kg (172 lb)   SpO2 98%     Current medications as of today   Current Outpatient Medications   Medication Sig Dispense Refill   • Mometasone Furoate (ASMANEX HFA) 100 MCG/ACT Aerosol Inhale 1 Puff 2 times a day. 1 Each 0   • Armodafinil 250 MG Tab Take 1 Tablet by mouth 1 time a day as needed (sleepiness) for up to 30 days. 30 Tablet 0   • amphetamine-dextroamphetamine (ADDERALL) 12.5 MG tablet Take 1 Tablet by mouth 3 times a day as needed (sleepiness) for up to 30 days. 90 Tablet 0   • oxyCODONE-acetaminophen (PERCOCET) 7.5-325 MG per tablet TAKE 1 ORAL TABLET FOUR TIMES A DAY. DNF UNTIL 01/18/2022- #120 FOR 30 DAYS     • ondansetron (ZOFRAN ODT) 4 MG TABLET DISPERSIBLE Take 4 mg by mouth every 8 hours as needed for Nausea/Vomiting.     • NON SPECIFIED Oxygen 2L N/C at noc     • pantoprazole (PROTONIX) 40 MG Tablet Delayed Response Take 40 mg by mouth every day.     • Albuterol Sulfate (PROAIR HFA INH) Inhale  by mouth as needed.     • lidocaine (XYLOCAINE) 5 % Ointment Apply  to affected area(s) as needed.     • testosterone (TESTIM,ANDROGEL) 50 MG/5GM (1%) GEL gel Apply 5 g to skin as directed 2 Times a Day.     • levothyroxine (SYNTHROID) 100 MCG TABS Take 100 mcg by mouth Every morning on an empty stomach.     • methocarbamol (ROBAXIN) 750 MG TABS Take 750 mg by mouth 3 times a day.     • gabapentin (NEURONTIN) 300 MG CAPS Take 300 mg by mouth 3 times a day.     • vitamin D, Ergocalciferol, (DRISDOL) 97022 UNITS CAPS capsule Take  by mouth every 7 days.       No current facility-administered medications for this visit.         Physical Exam:   Gen:           Alert and oriented, No apparent distress. Mood and affect appropriate, normal interaction with examiner.  Eyes:          PERRL, EOM intact, " sclere white, conjunctive moist.  Ears:          Not examined.   Hearing:     Grossly intact.  Nose:          Normal, no lesions or deformities.  Dentition:    Good dentition.  Oropharynx:   Tongue normal, posterior pharynx without erythema or exudate.  Mallampati Classification:   Neck:        Supple, trachea midline, no masses.  Respiratory Effort: No intercostal retractions or use of accessory muscles.   Lung Auscultation:      Clear to auscultation bilaterally; no rales, rhonchi or wheezing.  CV:            Regular rate and rhythm. No murmurs, rubs or gallops.  Abd:           Not examined.   Lymphadenopathy: Not examined.  Gait and Station: Normal.  Digits and Nails: No clubbing, cyanosis, petechiae, or nodes.   Cranial Nerves: II-XII grossly intact.  Skin:        No rashes, lesions or ulcers noted.               Ext:           No cyanosis or edema.      Assessment:  1. Wheezing  Referral to Pulmonary and Sleep Medicine    PULMONARY FUNCTION TESTS -Test requested: Complete Pulmonary Function Test   2. JENNIFER (obstructive sleep apnea)     3. Narcolepsy without cataplexy         Plan:  1.  Referral placed for pulmonology.  Order placed for PFTs.  Reviewed ER note for exacerbation on 7/3/2022.  Patient currently only using albuterol as needed.  He states he needs it 3-4 times a day.  Today I am prescribing a sample of Flovent 100 mcg.  Patient will send a message to let me know if he finds benefit for Flovent and notices a decrease in his need for albuterol.  Can follow-up with patient for this after initial MD consultation.  2.  Patient is using and benefiting from CPAP therapy.  He notes overall improvement in energy levels.  Compliance was reviewed and shows adequate use and control of JENNIFER with a resultant AHI of 3.5.  Advised nightly use for optimal benefit.  Patient will follow-up with Dr. Bhakta for her narcolepsy and JENNIFER management.      Please note that this dictation was created using voice recognition  software. I have made every reasonable attempt to correct obvious errors, but it is possible there are errors of grammar and possibly content that I did not discover before finalizing the note.

## 2022-09-02 DIAGNOSIS — G47.419 NARCOLEPSY WITHOUT CATAPLEXY: ICD-10-CM

## 2022-09-02 NOTE — TELEPHONE ENCOUNTER
Have we ever prescribed this med? Yes.  If yes, what date? 08/03/22    Last OV: 04/08/22     Next OV: NONE    DX: Narcolepsy without cataplexy (G47.419)    Medications: Armodafinil 250 MG Tab

## 2022-09-02 NOTE — TELEPHONE ENCOUNTER
Have we ever prescribed this med? Yes.  If yes, what date? 8/3/2022    Last OV: 8/4/2022 - Shashank CASTELLANOS     Next OV: 1/30/2023 - Dr. Jones     DX: Narcolepsy without cataplexy (G47.419)    Medications: Armodafinil 250 MG Tab    Have we ever prescribed this med? Yes.  If yes, what date? 8/3/2022    Last OV: 8/4/2022 - Shashank CASTELLANOS     Next OV: 1/30/2023 - Dr. 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

## 2022-09-02 NOTE — TELEPHONE ENCOUNTER
Have we ever prescribed this med? Yes.  If yes, what date? 08/04/22    Last OV: 08/04/22 SCOTT Shoemaker APRN    Next OV: 1/30/2023 - Dr. Jones     DX:     Medications: Mometasone Furoate (ASMANEX HFA) 100 MCG/ACT Aerosol

## 2022-09-06 RX ORDER — ARMODAFINIL 250 MG/1
1 TABLET ORAL
Qty: 30 TABLET | Refills: 0 | Status: SHIPPED | OUTPATIENT
Start: 2022-09-06 | End: 2022-10-05 | Stop reason: SDUPTHER

## 2022-09-06 RX ORDER — DEXTROAMPHETAMINE SACCHARATE, AMPHETAMINE ASPARTATE, DEXTROAMPHETAMINE SULFATE AND AMPHETAMINE SULFATE 3.125; 3.125; 3.125; 3.125 MG/1; MG/1; MG/1; MG/1
12.5 TABLET ORAL 3 TIMES DAILY PRN
Qty: 90 TABLET | Refills: 0 | Status: SHIPPED | OUTPATIENT
Start: 2022-09-06 | End: 2022-10-05 | Stop reason: SDUPTHER

## 2022-09-06 RX ORDER — MOMETASONE FUROATE 100 UG/1
1 AEROSOL RESPIRATORY (INHALATION) 2 TIMES DAILY
Qty: 1 EACH | Refills: 0 | Status: SHIPPED | OUTPATIENT
Start: 2022-09-06 | End: 2022-10-05 | Stop reason: SDUPTHER

## 2022-10-05 DIAGNOSIS — G47.419 NARCOLEPSY WITHOUT CATAPLEXY: ICD-10-CM

## 2022-10-05 RX ORDER — DEXTROAMPHETAMINE SACCHARATE, AMPHETAMINE ASPARTATE, DEXTROAMPHETAMINE SULFATE AND AMPHETAMINE SULFATE 3.125; 3.125; 3.125; 3.125 MG/1; MG/1; MG/1; MG/1
12.5 TABLET ORAL 3 TIMES DAILY PRN
Qty: 90 TABLET | Refills: 0 | Status: SHIPPED | OUTPATIENT
Start: 2022-10-05 | End: 2022-11-02 | Stop reason: SDUPTHER

## 2022-10-05 RX ORDER — ARMODAFINIL 250 MG/1
1 TABLET ORAL
Qty: 30 TABLET | Refills: 0 | Status: SHIPPED | OUTPATIENT
Start: 2022-10-05 | End: 2022-11-02 | Stop reason: SDUPTHER

## 2022-10-05 NOTE — TELEPHONE ENCOUNTER
Have we ever prescribed this med? Yes.  If yes, what date? 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 Yes.  If yes, what date? 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

## 2022-10-25 ENCOUNTER — TELEPHONE (OUTPATIENT)
Dept: SLEEP MEDICINE | Facility: MEDICAL CENTER | Age: 37
End: 2022-10-25

## 2022-10-25 ENCOUNTER — NON-PROVIDER VISIT (OUTPATIENT)
Dept: SLEEP MEDICINE | Facility: MEDICAL CENTER | Age: 37
End: 2022-10-25
Attending: NURSE PRACTITIONER
Payer: MEDICARE

## 2022-10-25 VITALS — BODY MASS INDEX: 26.37 KG/M2 | HEIGHT: 68 IN | WEIGHT: 174 LBS

## 2022-10-25 DIAGNOSIS — R06.2 WHEEZING: ICD-10-CM

## 2022-10-25 PROCEDURE — 94726 PLETHYSMOGRAPHY LUNG VOLUMES: CPT | Performed by: STUDENT IN AN ORGANIZED HEALTH CARE EDUCATION/TRAINING PROGRAM

## 2022-10-25 PROCEDURE — 94060 EVALUATION OF WHEEZING: CPT | Performed by: STUDENT IN AN ORGANIZED HEALTH CARE EDUCATION/TRAINING PROGRAM

## 2022-10-25 PROCEDURE — 94729 DIFFUSING CAPACITY: CPT | Performed by: STUDENT IN AN ORGANIZED HEALTH CARE EDUCATION/TRAINING PROGRAM

## 2022-10-25 ASSESSMENT — PULMONARY FUNCTION TESTS
FEV1/FVC_PERCENT_LLN: 68
FEV1_LLN: 3.37
FEV1/FVC: 78
FEV1/FVC_PERCENT_PREDICTED: 94
FEV1_PERCENT_CHANGE: 7
FEV1/FVC_PREDICTED: 82
FVC: 4.16
FVC_PREDICTED: 4.96
FEV1/FVC: 75
FEV1_PERCENT_PREDICTED: 86
FVC_PERCENT_PREDICTED: 90
FEV1/FVC_PERCENT_CHANGE: 3
FEV1_PREDICTED: 4.04
FEV1/FVC: 75
FEV1/FVC_PERCENT_PREDICTED: 96
FEV1/FVC_PERCENT_PREDICTED: 91
FEV1_PERCENT_CHANGE: 11
FVC_PERCENT_PREDICTED: 84
FEV1_PERCENT_PREDICTED: 77
FEV1: 3.48
FVC: 4.48
FEV1/FVC_PERCENT_PREDICTED: 92
FEV1/FVC_PERCENT_CHANGE: 157
FVC_LLN: 4.14
FEV1/FVC_PERCENT_PREDICTED: 81
FEV1/FVC: 77.68
FEV1: 3.11

## 2022-10-25 ASSESSMENT — FIBROSIS 4 INDEX: FIB4 SCORE: 0.88

## 2022-10-25 NOTE — TELEPHONE ENCOUNTER
Pt has had a lot of improvement on Asmanex but he just found out it is not on his formulary anymore. He will need alternative RX to [facility] on [address] [place].

## 2022-10-25 NOTE — PROCEDURES
Technician: OTIS Mason    Technician Comment:  Good patient effort & cooperation.  The results of this test meet the ATS/ERS standards for acceptability & reproducibility.  Test was performed on the [employer] Body Plethysmograph-Elite DX system.  Predicted values were GLI-2012 for spirometry, GLI-2017 for DLCO, ITS for Lung Volumes.  The DLCO was uncorrected for Hgb.  A bronchodilator of Ventolin HFA -2puffs via spacer administered.  DLCO performed during dilation period.    Interpretation:     No obstruction or restriction.  There is a significant bronchodilator response.  The MVV is reduced disproportionately to the degree of reduction in FEV1.  The reduced MVV may reflect suboptimal effort, neuromuscular disease or upper airway obstruction.  Suggest maximal inspiratory pressures and maximal expiratory pressures for further evaluation.  Normal diffusion capacity.        Nanette Edwards MD  Pulmonary and Critical Care Medicine  [facility]

## 2022-10-27 NOTE — TELEPHONE ENCOUNTER
Last seen 8/4/22 SCOTT Shoemaker, aprn   Next OV 1/30/2023 Dr. Jones NEW PATIENT     Please advise send alternative inhaler.

## 2022-11-01 RX ORDER — FLUTICASONE PROPIONATE 110 UG/1
2 AEROSOL, METERED RESPIRATORY (INHALATION) 2 TIMES DAILY
Qty: 1 EACH | Refills: 5 | Status: SHIPPED | OUTPATIENT
Start: 2022-11-01 | End: 2023-01-30 | Stop reason: SDUPTHER

## 2022-11-02 DIAGNOSIS — G47.419 NARCOLEPSY WITHOUT CATAPLEXY: ICD-10-CM

## 2022-11-02 RX ORDER — ARMODAFINIL 250 MG/1
1 TABLET ORAL
Qty: 30 TABLET | Refills: 0 | Status: SHIPPED | OUTPATIENT
Start: 2022-11-02 | End: 2022-11-29 | Stop reason: SDUPTHER

## 2022-11-02 RX ORDER — DEXTROAMPHETAMINE SACCHARATE, AMPHETAMINE ASPARTATE, DEXTROAMPHETAMINE SULFATE AND AMPHETAMINE SULFATE 3.125; 3.125; 3.125; 3.125 MG/1; MG/1; MG/1; MG/1
12.5 TABLET ORAL 3 TIMES DAILY PRN
Qty: 90 TABLET | Refills: 0 | Status: SHIPPED | OUTPATIENT
Start: 2022-11-02 | End: 2022-11-29 | Stop reason: SDUPTHER

## 2022-11-02 NOTE — TELEPHONE ENCOUNTER
Attempt to reach patient via phone. Voice system full.       WeTag message sent to the patient informed of alternative inhaler and directions.

## 2022-11-02 NOTE — TELEPHONE ENCOUNTER
Have we ever prescribed this med? Yes.  If yes, what date? 10/5/2022    Last OV: 4/8/2022 - Dr. 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 Yes.  If yes, what date? 10/5/2022    Last OV: 4/8/2022 - Dr. 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

## 2022-11-08 ENCOUNTER — PATIENT MESSAGE (OUTPATIENT)
Dept: HEALTH INFORMATION MANAGEMENT | Facility: OTHER | Age: 37
End: 2022-11-08

## 2022-11-29 DIAGNOSIS — G47.419 NARCOLEPSY WITHOUT CATAPLEXY: ICD-10-CM

## 2022-11-29 RX ORDER — ARMODAFINIL 250 MG/1
1 TABLET ORAL
Qty: 30 TABLET | Refills: 0 | Status: SHIPPED | OUTPATIENT
Start: 2022-11-29 | End: 2022-12-29

## 2022-11-29 RX ORDER — DEXTROAMPHETAMINE SACCHARATE, AMPHETAMINE ASPARTATE, DEXTROAMPHETAMINE SULFATE AND AMPHETAMINE SULFATE 3.125; 3.125; 3.125; 3.125 MG/1; MG/1; MG/1; MG/1
12.5 TABLET ORAL 3 TIMES DAILY PRN
Qty: 90 TABLET | Refills: 0 | Status: SHIPPED | OUTPATIENT
Start: 2022-11-29 | End: 2022-12-28 | Stop reason: SDUPTHER

## 2022-11-29 NOTE — TELEPHONE ENCOUNTER
Have we ever prescribed this med? Yes.  If yes, what date? 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 Yes.  If yes, what date? 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

## 2022-12-28 DIAGNOSIS — G47.419 NARCOLEPSY WITHOUT CATAPLEXY: ICD-10-CM

## 2022-12-29 RX ORDER — DEXTROAMPHETAMINE SACCHARATE, AMPHETAMINE ASPARTATE, DEXTROAMPHETAMINE SULFATE AND AMPHETAMINE SULFATE 3.125; 3.125; 3.125; 3.125 MG/1; MG/1; MG/1; MG/1
12.5 TABLET ORAL 3 TIMES DAILY PRN
Qty: 90 TABLET | Refills: 0 | Status: SHIPPED | OUTPATIENT
Start: 2022-12-29 | End: 2023-01-30 | Stop reason: SDUPTHER

## 2022-12-29 NOTE — TELEPHONE ENCOUNTER
Have we ever prescribed this med? Yes.  If yes, what date? 11/29/2022 DR. RIVERS    Last OV: 08/04/2022 SCOTT CASTELLANOS (SLEEP)    Next OV: NO PENDING SLEEP FOLLOW UP SCHEDULED   2.  Patient is using and benefiting from CPAP therapy.  He notes overall improvement in energy levels.  Compliance was reviewed and shows adequate use and control of JENNIFER with a resultant AHI of 3.5.  Advised nightly use for optimal benefit.  Patient will follow-up with Dr. Rivers for her narcolepsy and JENNIFER management.      DX: Narcolepsy without cataplexy    Medications: amphetamine-dextroamphetamine (ADDERALL) 12.5 MG tablet

## 2023-01-02 ENCOUNTER — PATIENT MESSAGE (OUTPATIENT)
Dept: SLEEP MEDICINE | Facility: MEDICAL CENTER | Age: 38
End: 2023-01-02
Payer: MEDICARE

## 2023-01-02 DIAGNOSIS — G47.419 NARCOLEPSY WITHOUT CATAPLEXY: ICD-10-CM

## 2023-01-02 DIAGNOSIS — G47.19 EXCESSIVE DAYTIME SLEEPINESS: ICD-10-CM

## 2023-01-03 ENCOUNTER — PATIENT MESSAGE (OUTPATIENT)
Dept: SLEEP MEDICINE | Facility: MEDICAL CENTER | Age: 38
End: 2023-01-03
Payer: MEDICARE

## 2023-01-03 RX ORDER — MODAFINIL 200 MG/1
200 TABLET ORAL 2 TIMES DAILY
Qty: 60 TABLET | Refills: 0 | Status: SHIPPED | OUTPATIENT
Start: 2023-01-03 | End: 2023-01-30 | Stop reason: SDUPTHER

## 2023-01-30 ENCOUNTER — OFFICE VISIT (OUTPATIENT)
Dept: SLEEP MEDICINE | Facility: MEDICAL CENTER | Age: 38
End: 2023-01-30
Payer: MEDICARE

## 2023-01-30 VITALS
DIASTOLIC BLOOD PRESSURE: 86 MMHG | SYSTOLIC BLOOD PRESSURE: 134 MMHG | BODY MASS INDEX: 27.74 KG/M2 | WEIGHT: 183 LBS | OXYGEN SATURATION: 99 % | HEIGHT: 68 IN | RESPIRATION RATE: 16 BRPM | HEART RATE: 82 BPM

## 2023-01-30 DIAGNOSIS — G47.419 NARCOLEPSY WITHOUT CATAPLEXY: ICD-10-CM

## 2023-01-30 DIAGNOSIS — J96.11 CHRONIC RESPIRATORY FAILURE WITH HYPOXIA (HCC): ICD-10-CM

## 2023-01-30 DIAGNOSIS — G47.19 EXCESSIVE DAYTIME SLEEPINESS: ICD-10-CM

## 2023-01-30 DIAGNOSIS — Z87.891 FORMER SMOKER: ICD-10-CM

## 2023-01-30 DIAGNOSIS — Z72.89 VAPES NON-NICOTINE CONTAINING SUBSTANCE: ICD-10-CM

## 2023-01-30 DIAGNOSIS — J45.40 MODERATE PERSISTENT ASTHMA WITHOUT COMPLICATION: ICD-10-CM

## 2023-01-30 PROCEDURE — 99214 OFFICE O/P EST MOD 30 MIN: CPT | Performed by: INTERNAL MEDICINE

## 2023-01-30 RX ORDER — ALBUTEROL SULFATE 2.5 MG/3ML
2.5 SOLUTION RESPIRATORY (INHALATION) EVERY 4 HOURS PRN
Qty: 120 ML | Refills: 11 | Status: SHIPPED | OUTPATIENT
Start: 2023-01-30 | End: 2023-02-06 | Stop reason: SDUPTHER

## 2023-01-30 RX ORDER — DEXTROAMPHETAMINE SACCHARATE, AMPHETAMINE ASPARTATE, DEXTROAMPHETAMINE SULFATE AND AMPHETAMINE SULFATE 3.125; 3.125; 3.125; 3.125 MG/1; MG/1; MG/1; MG/1
12.5 TABLET ORAL 3 TIMES DAILY PRN
Qty: 90 TABLET | Refills: 0 | Status: SHIPPED | OUTPATIENT
Start: 2023-01-30 | End: 2023-03-02 | Stop reason: SDUPTHER

## 2023-01-30 RX ORDER — ALBUTEROL SULFATE 90 UG/1
2 AEROSOL, METERED RESPIRATORY (INHALATION) EVERY 4 HOURS PRN
Qty: 1 EACH | Refills: 11 | Status: SHIPPED | OUTPATIENT
Start: 2023-01-30

## 2023-01-30 RX ORDER — MODAFINIL 200 MG/1
200 TABLET ORAL 2 TIMES DAILY
Qty: 60 TABLET | Refills: 0 | Status: SHIPPED | OUTPATIENT
Start: 2023-01-30 | End: 2023-03-02 | Stop reason: SDUPTHER

## 2023-01-30 RX ORDER — FLUTICASONE PROPIONATE 110 UG/1
2 AEROSOL, METERED RESPIRATORY (INHALATION) 2 TIMES DAILY
Qty: 3 EACH | Refills: 3 | Status: SHIPPED | OUTPATIENT
Start: 2023-01-30 | End: 2023-10-05 | Stop reason: SDUPTHER

## 2023-01-30 ASSESSMENT — ENCOUNTER SYMPTOMS
HEMOPTYSIS: 0
DOUBLE VISION: 0
ABDOMINAL PAIN: 0
BLURRED VISION: 0
SPUTUM PRODUCTION: 0
EYE REDNESS: 0
CHILLS: 0
WEIGHT LOSS: 0
SORE THROAT: 0
SPEECH CHANGE: 0
DIAPHORESIS: 0
VOMITING: 0
COUGH: 0
EYE DISCHARGE: 0
DYSPNEA AT REST: 0
DEPRESSION: 0
STRIDOR: 0
BACK PAIN: 0
FALLS: 0
CONSTIPATION: 0
HEARTBURN: 0
WHEEZING: 1
MYALGIAS: 0
CHEST TIGHTNESS: 0
WEAKNESS: 0
PALPITATIONS: 0
SHORTNESS OF BREATH: 1
ORTHOPNEA: 0
FEVER: 0
TREMORS: 0
RESPIRATORY SYMPTOMS COMMENTS: SOMETIMES
CLAUDICATION: 0
SINUS PAIN: 0
EYE PAIN: 0
NAUSEA: 0
PHOTOPHOBIA: 0
FOCAL WEAKNESS: 0
DIARRHEA: 0
HEADACHES: 0
NECK PAIN: 0
DIZZINESS: 0
PND: 0

## 2023-01-30 ASSESSMENT — FIBROSIS 4 INDEX: FIB4 SCORE: 0.93

## 2023-01-30 NOTE — TELEPHONE ENCOUNTER
Have we ever prescribed this med? Yes.  If yes, what date? 1/3/2023    Last OV: 8/4/2022 -Shashank CASTELLANOS     Next OV: 1/30/2023 - Dr. 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 Yes.  If yes, what date? 12/29/2022    Last OV: 8/4/2022 -Shashank CASTELLANOS     Next OV: 1/30/2023 - Dr. 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

## 2023-01-30 NOTE — PROGRESS NOTES
Chief Complaint   Patient presents with    Establish Care     REFERRAL Shashank Shoemaker, noel DX Wheezing, asthma     Results     PFT 10/25/22, cxr 7/3/22        HPI: This patient is a 37 y.o. male presenting for evaluation of asthma and chronic hypoxic respiratory failure.  The patient's past medical history is significant for childhood asthma which is complicated by spinal spasms for which he has a baclofen pump in place, narcolepsy and obstructive sleep apnea currently followed by our sleep clinic, hypothyroid and pericarditis diagnosed in 2015 after which patient states he has been on chronic supplemental oxygen.  He is a former tobacco smoker with roughly 10-pack-year history and quit 14 years ago.  He does use marijuana via vape pen for pain.  He is currently on disability therefore does not work.  Family history is notable for mother who was a tobacco smoker and on oxygen for lung disease, also suffered from lung cancer.  Prior to visiting with our sleep providers he was using albuterol only 3-4 times per day for wheezing and shortness of breath.  He was started on Flovent 100 after which he has used his albuterol at most once per week.  He cannot identify any specific triggers to his asthma and denies significant allergy symptoms.  Pulmonary function testing from October 25 showed mildly reduced FEV1 prebronchodilator at 77% predicted with a normal ratio, significant bronchodilator response, normal total lung capacity with mild air trapping and elevated DLCO all consistent with reactive airways disease.  A chest x-ray from September was read as normal.  Echocardiogram from September 2022 also normal with normal LV systolic and diastolic function, normal RV size and function.  No evidence of intra atrial shunting.    Past Medical History:   Diagnosis Date    ASTHMA     Back pain     Cerebral palsy (HCC)     Chickenpox     Daytime sleepiness     GERD (gastroesophageal reflux disease)     Hypothyroidism      Narcolepsy     Pain     cerebral palsy,    Rash     Rhinitis     Shortness of breath     Sleep apnea     Unspecified disorder of thyroid     Wheezing        Social History     Socioeconomic History    Marital status: Single     Spouse name: Not on file    Number of children: Not on file    Years of education: Not on file    Highest education level: Not on file   Occupational History    Not on file   Tobacco Use    Smoking status: Former     Packs/day: 1.00     Years: 14.00     Pack years: 14.00     Types: Cigarettes     Quit date: 1/1/2008     Years since quitting: 15.0     Passive exposure: Past    Smokeless tobacco: Never   Vaping Use    Vaping Use: Every day    Substances: THC, CBD    Devices: Disposable, Pre-filled or refillable cartridge    Passive vaping exposure: Yes   Substance and Sexual Activity    Alcohol use: Not Currently     Alcohol/week: 1.8 oz     Types: 3 Standard drinks or equivalent per week    Drug use: Yes     Types: Marijuana, Inhaled     Comment: vape pen rather than smoking the medical marijuana    Sexual activity: Not on file   Other Topics Concern    Not on file   Social History Narrative    Not on file     Social Determinants of Health     Financial Resource Strain: Not on file   Food Insecurity: Not on file   Transportation Needs: Not on file   Physical Activity: Not on file   Stress: Not on file   Social Connections: Not on file   Intimate Partner Violence: Not on file   Housing Stability: Not on file       Family History   Problem Relation Age of Onset    Sleep Apnea Maternal Uncle        Current Outpatient Medications on File Prior to Visit   Medication Sig Dispense Refill    oxyCODONE-acetaminophen (PERCOCET) 7.5-325 MG per tablet TAKE 1 ORAL TABLET FOUR TIMES A DAY. DNF UNTIL 01/18/2022- #120 FOR 30 DAYS      ondansetron (ZOFRAN ODT) 4 MG TABLET DISPERSIBLE Take 4 mg by mouth every 8 hours as needed for Nausea/Vomiting.      NON SPECIFIED Oxygen 2L N/C at noc      pantoprazole  (PROTONIX) 40 MG Tablet Delayed Response Take 40 mg by mouth every day.      lidocaine (XYLOCAINE) 5 % Ointment Apply  to affected area(s) as needed.      testosterone (TESTIM,ANDROGEL) 50 MG/5GM (1%) GEL gel Apply 5 g to skin as directed 2 Times a Day.      levothyroxine (SYNTHROID) 100 MCG TABS Take 100 mcg by mouth Every morning on an empty stomach.      methocarbamol (ROBAXIN) 750 MG TABS Take 750 mg by mouth 3 times a day.      gabapentin (NEURONTIN) 300 MG CAPS Take 300 mg by mouth 3 times a day.      vitamin D, Ergocalciferol, (DRISDOL) 82664 UNITS CAPS capsule Take  by mouth every 7 days.      Albuterol Sulfate (PROAIR HFA INH) Inhale  by mouth as needed.       No current facility-administered medications on file prior to visit.       Allergies: Patient has no known allergies.    ROS:   Review of Systems   Constitutional:  Negative for chills, diaphoresis, fever, malaise/fatigue and weight loss.   HENT:  Negative for congestion, ear discharge, ear pain, hearing loss, nosebleeds, sinus pain, sore throat and tinnitus.    Eyes:  Negative for blurred vision, double vision, photophobia, pain, discharge and redness.   Respiratory:  Positive for shortness of breath and wheezing. Negative for cough, hemoptysis, sputum production and stridor.    Cardiovascular:  Negative for chest pain, palpitations, orthopnea, claudication, leg swelling and PND.   Gastrointestinal:  Negative for abdominal pain, constipation, diarrhea, heartburn, nausea and vomiting.   Genitourinary:  Negative for dysuria and urgency.   Musculoskeletal:  Negative for back pain, falls, joint pain, myalgias and neck pain.   Skin:  Negative for itching and rash.   Neurological:  Negative for dizziness, tremors, speech change, focal weakness, weakness and headaches.   Endo/Heme/Allergies:  Negative for environmental allergies.   Psychiatric/Behavioral:  Negative for depression.      /86 (BP Location: Right arm, Patient Position: Sitting, BP Cuff  "Size: Adult)   Pulse 82   Resp 16   Ht 1.727 m (5' 8\")   Wt 83 kg (183 lb)   SpO2 99%     Physical Exam:  Physical Exam  Vitals reviewed.   Constitutional:       General: He is not in acute distress.     Appearance: Normal appearance. He is normal weight.   HENT:      Head: Normocephalic and atraumatic.      Right Ear: External ear normal.      Left Ear: External ear normal.      Nose: Nose normal. No congestion.      Mouth/Throat:      Mouth: Mucous membranes are moist.      Pharynx: Oropharynx is clear. No oropharyngeal exudate.   Eyes:      General: No scleral icterus.     Extraocular Movements: Extraocular movements intact.      Conjunctiva/sclera: Conjunctivae normal.      Pupils: Pupils are equal, round, and reactive to light.   Cardiovascular:      Rate and Rhythm: Normal rate and regular rhythm.      Heart sounds: Normal heart sounds. No murmur heard.    No gallop.   Pulmonary:      Effort: Pulmonary effort is normal. No respiratory distress.      Breath sounds: Normal breath sounds. No wheezing or rales.   Abdominal:      General: There is no distension.      Palpations: Abdomen is soft.   Musculoskeletal:         General: Normal range of motion.      Cervical back: Normal range of motion and neck supple.      Right lower leg: No edema.      Left lower leg: No edema.   Skin:     General: Skin is warm and dry.      Findings: No rash.   Neurological:      Mental Status: He is alert and oriented to person, place, and time.      Cranial Nerves: No cranial nerve deficit.   Psychiatric:         Mood and Affect: Mood normal.         Behavior: Behavior normal.       PFTs as reviewed by me personally: As per HPI    Imaging as reviewed by me personally: As per HPI    Assessment:  1. Moderate persistent asthma without complication  DME Nebulizer      2. Chronic respiratory failure with hypoxia (HCC)  Exercise Test for Bronchospasm / 6-Minute Walk      3. Narcolepsy without cataplexy        4. Vapes non-nicotine " containing substance        5. Former smoker            Plan:  This is a chronic diagnosis but new to me and he has significantly improved control on inhaled corticosteroids low-dose with short acting bronchodilators as needed.  We will continue this regimen and I did provide him with a nebulizer for therapy should symptoms worsen.  I reinforced the dangers of marijuana vape pen use particularly in somebody with underlying reactive airways disease.  This is unexplained at this point.  He has normal cardiac function with no shunting and normal pulmonary function with elevated DLCO.  I do not see where anyone has documented his need for oxygen use other than related to sleep disordered breathing.  I have ordered 6-minute walk test on room air.  Followed by and managed by sleep medicine.  Counseled on the dangers of vape pen use.  Tobacco free.  Not a candidate for lung cancer screening.  Return in about 5 months (around 6/30/2023) for 6 minute walk test.

## 2023-02-06 ENCOUNTER — PATIENT MESSAGE (OUTPATIENT)
Dept: SLEEP MEDICINE | Facility: MEDICAL CENTER | Age: 38
End: 2023-02-06
Payer: MEDICARE

## 2023-02-06 DIAGNOSIS — J96.11 CHRONIC RESPIRATORY FAILURE WITH HYPOXIA (HCC): ICD-10-CM

## 2023-02-06 RX ORDER — ALBUTEROL SULFATE 2.5 MG/3ML
2.5 SOLUTION RESPIRATORY (INHALATION) EVERY 4 HOURS PRN
Qty: 120 ML | Refills: 11 | Status: SHIPPED
Start: 2023-02-06 | End: 2023-09-08 | Stop reason: SDUPTHER

## 2023-02-07 NOTE — PATIENT COMMUNICATION
Patient is requesting RX for nebulizer solution to [facility]. Informed if [facility] informs of other treatment options he will need to discuss with provider.

## 2023-02-09 ENCOUNTER — TELEPHONE (OUTPATIENT)
Dept: SLEEP MEDICINE | Facility: MEDICAL CENTER | Age: 38
End: 2023-02-09
Payer: MEDICARE

## 2023-03-02 DIAGNOSIS — G47.19 EXCESSIVE DAYTIME SLEEPINESS: ICD-10-CM

## 2023-03-02 DIAGNOSIS — G47.419 NARCOLEPSY WITHOUT CATAPLEXY: ICD-10-CM

## 2023-03-02 RX ORDER — DEXTROAMPHETAMINE SACCHARATE, AMPHETAMINE ASPARTATE, DEXTROAMPHETAMINE SULFATE AND AMPHETAMINE SULFATE 3.125; 3.125; 3.125; 3.125 MG/1; MG/1; MG/1; MG/1
12.5 TABLET ORAL 3 TIMES DAILY PRN
Qty: 90 TABLET | Refills: 0 | Status: SHIPPED | OUTPATIENT
Start: 2023-03-02 | End: 2023-03-30 | Stop reason: SDUPTHER

## 2023-03-02 RX ORDER — MODAFINIL 200 MG/1
200 TABLET ORAL 2 TIMES DAILY
Qty: 60 TABLET | Refills: 0 | Status: SHIPPED | OUTPATIENT
Start: 2023-03-02 | End: 2023-03-30 | Stop reason: SDUPTHER

## 2023-03-02 NOTE — TELEPHONE ENCOUNTER
Have we ever prescribed this med? Yes.  If yes, what date? 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

## 2023-03-30 DIAGNOSIS — G47.419 NARCOLEPSY WITHOUT CATAPLEXY: ICD-10-CM

## 2023-03-30 DIAGNOSIS — G47.19 EXCESSIVE DAYTIME SLEEPINESS: ICD-10-CM

## 2023-03-30 NOTE — TELEPHONE ENCOUNTER
Have we ever prescribed this med? Yes.  If yes, what date? 3/2/2023    Last OV: 8/4/2022 - ned cohen     Next OV:  7/5/2023- Dr. 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 Yes.  If yes, what date?  3/2/2023    Last OV:  8/4/2022 - ned cohen     Next OV: 7/5/2023- Dr. 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

## 2023-04-03 RX ORDER — DEXTROAMPHETAMINE SACCHARATE, AMPHETAMINE ASPARTATE, DEXTROAMPHETAMINE SULFATE AND AMPHETAMINE SULFATE 3.125; 3.125; 3.125; 3.125 MG/1; MG/1; MG/1; MG/1
12.5 TABLET ORAL 3 TIMES DAILY PRN
Qty: 90 TABLET | Refills: 0 | Status: SHIPPED | OUTPATIENT
Start: 2023-04-03 | End: 2023-05-02 | Stop reason: SDUPTHER

## 2023-04-03 RX ORDER — MODAFINIL 200 MG/1
200 TABLET ORAL 2 TIMES DAILY
Qty: 60 TABLET | Refills: 0 | Status: SHIPPED | OUTPATIENT
Start: 2023-04-03 | End: 2023-05-02 | Stop reason: SDUPTHER

## 2023-06-05 DIAGNOSIS — G47.19 EXCESSIVE DAYTIME SLEEPINESS: ICD-10-CM

## 2023-06-05 DIAGNOSIS — G47.419 NARCOLEPSY WITHOUT CATAPLEXY: ICD-10-CM

## 2023-06-05 RX ORDER — MODAFINIL 200 MG/1
200 TABLET ORAL 2 TIMES DAILY
Qty: 60 TABLET | Refills: 0 | Status: SHIPPED | OUTPATIENT
Start: 2023-06-05 | End: 2023-07-05 | Stop reason: SDUPTHER

## 2023-06-05 RX ORDER — DEXTROAMPHETAMINE SACCHARATE, AMPHETAMINE ASPARTATE, DEXTROAMPHETAMINE SULFATE AND AMPHETAMINE SULFATE 3.125; 3.125; 3.125; 3.125 MG/1; MG/1; MG/1; MG/1
12.5 TABLET ORAL 3 TIMES DAILY PRN
Qty: 90 TABLET | Refills: 0 | Status: SHIPPED | OUTPATIENT
Start: 2023-06-05 | End: 2023-06-09

## 2023-06-05 NOTE — TELEPHONE ENCOUNTER
Have we ever prescribed this med? Yes.  If yes, what date? 5/3/2023    Last OV: 8/4/2022 - Shashank CASTELLANOS     Next OV:  7/5/2023 -      DX: Narcolepsy without cataplexy [G47.419]; Excessive daytime sleepiness [G47.19]    Medications: modafinil (PROVIGIL) 200 MG Tab    Have we ever prescribed this med? Yes.  If yes, what date? 5/3/2023    Last OV: 8/4/2022 - Shashank CASTELLANOS     Next OV: 7/5/2023 -      DX: Narcolepsy without cataplexy [G47.419]    Medications: amphetamine-dextroamphetamine (ADDERALL) 12.5 MG tablet

## 2023-06-08 ENCOUNTER — PATIENT MESSAGE (OUTPATIENT)
Dept: SLEEP MEDICINE | Facility: MEDICAL CENTER | Age: 38
End: 2023-06-08
Payer: MEDICARE

## 2023-06-08 DIAGNOSIS — G47.19 EXCESSIVE DAYTIME SLEEPINESS: ICD-10-CM

## 2023-06-08 DIAGNOSIS — G47.419 NARCOLEPSY WITHOUT CATAPLEXY: Primary | ICD-10-CM

## 2023-06-09 ENCOUNTER — PATIENT MESSAGE (OUTPATIENT)
Dept: SLEEP MEDICINE | Facility: MEDICAL CENTER | Age: 38
End: 2023-06-09
Payer: MEDICARE

## 2023-06-09 DIAGNOSIS — G47.419 NARCOLEPSY WITHOUT CATAPLEXY: ICD-10-CM

## 2023-06-09 DIAGNOSIS — G47.19 EXCESSIVE DAYTIME SLEEPINESS: ICD-10-CM

## 2023-06-09 RX ORDER — DEXTROAMPHETAMINE SACCHARATE, AMPHETAMINE ASPARTATE, DEXTROAMPHETAMINE SULFATE AND AMPHETAMINE SULFATE 2.5; 2.5; 2.5; 2.5 MG/1; MG/1; MG/1; MG/1
10 TABLET ORAL 3 TIMES DAILY
Qty: 90 TABLET | Refills: 0 | Status: SHIPPED | OUTPATIENT
Start: 2023-06-09 | End: 2023-07-06 | Stop reason: SDUPTHER

## 2023-06-09 RX ORDER — MODAFINIL 200 MG/1
200 TABLET ORAL 2 TIMES DAILY PRN
Qty: 60 TABLET | Refills: 0 | Status: SHIPPED | OUTPATIENT
Start: 2023-06-09 | End: 2023-07-05

## 2023-06-09 RX ORDER — DEXTROAMPHETAMINE SACCHARATE, AMPHETAMINE ASPARTATE, DEXTROAMPHETAMINE SULFATE AND AMPHETAMINE SULFATE 3.125; 3.125; 3.125; 3.125 MG/1; MG/1; MG/1; MG/1
12.5 TABLET ORAL 3 TIMES DAILY PRN
Qty: 90 TABLET | Refills: 0 | Status: SHIPPED | OUTPATIENT
Start: 2023-06-09 | End: 2023-06-09

## 2023-07-05 ENCOUNTER — OFFICE VISIT (OUTPATIENT)
Dept: SLEEP MEDICINE | Facility: MEDICAL CENTER | Age: 38
End: 2023-07-05
Attending: INTERNAL MEDICINE
Payer: MEDICARE

## 2023-07-05 VITALS
HEART RATE: 85 BPM | HEIGHT: 68 IN | SYSTOLIC BLOOD PRESSURE: 122 MMHG | WEIGHT: 175 LBS | DIASTOLIC BLOOD PRESSURE: 78 MMHG | OXYGEN SATURATION: 99 % | BODY MASS INDEX: 26.52 KG/M2

## 2023-07-05 DIAGNOSIS — G47.419 NARCOLEPSY WITHOUT CATAPLEXY: ICD-10-CM

## 2023-07-05 DIAGNOSIS — G47.33 OSA (OBSTRUCTIVE SLEEP APNEA): ICD-10-CM

## 2023-07-05 DIAGNOSIS — G47.19 EXCESSIVE DAYTIME SLEEPINESS: ICD-10-CM

## 2023-07-05 DIAGNOSIS — J96.11 CHRONIC RESPIRATORY FAILURE WITH HYPOXIA (HCC): ICD-10-CM

## 2023-07-05 DIAGNOSIS — J45.40 MODERATE PERSISTENT ASTHMA WITHOUT COMPLICATION: ICD-10-CM

## 2023-07-05 DIAGNOSIS — Z72.89 VAPES NON-NICOTINE CONTAINING SUBSTANCE: ICD-10-CM

## 2023-07-05 PROCEDURE — 99213 OFFICE O/P EST LOW 20 MIN: CPT | Performed by: INTERNAL MEDICINE

## 2023-07-05 PROCEDURE — 99214 OFFICE O/P EST MOD 30 MIN: CPT | Mod: 25 | Performed by: INTERNAL MEDICINE

## 2023-07-05 PROCEDURE — 94618 PULMONARY STRESS TESTING: CPT | Performed by: INTERNAL MEDICINE

## 2023-07-05 PROCEDURE — 3078F DIAST BP <80 MM HG: CPT | Performed by: INTERNAL MEDICINE

## 2023-07-05 PROCEDURE — 94618 PULMONARY STRESS TESTING: CPT | Mod: 26 | Performed by: INTERNAL MEDICINE

## 2023-07-05 PROCEDURE — 3074F SYST BP LT 130 MM HG: CPT | Performed by: INTERNAL MEDICINE

## 2023-07-05 RX ORDER — MODAFINIL 200 MG/1
200 TABLET ORAL 2 TIMES DAILY
Qty: 60 TABLET | Refills: 0 | Status: SHIPPED | OUTPATIENT
Start: 2023-07-05 | End: 2023-08-08 | Stop reason: SDUPTHER

## 2023-07-05 ASSESSMENT — ENCOUNTER SYMPTOMS
ABDOMINAL PAIN: 0
NAUSEA: 0
WEIGHT LOSS: 0
TREMORS: 0
SINUS PAIN: 0
EYE REDNESS: 0
DEPRESSION: 0
VOMITING: 0
NECK PAIN: 0
FEVER: 0
BLURRED VISION: 0
EYE PAIN: 0
CLAUDICATION: 0
BACK PAIN: 0
WEAKNESS: 0
HEMOPTYSIS: 0
PALPITATIONS: 0
FALLS: 0
COUGH: 0
EYE DISCHARGE: 0
CHILLS: 0
PND: 0
HEARTBURN: 0
DIZZINESS: 0
CONSTIPATION: 0
SORE THROAT: 0
SPEECH CHANGE: 0
PHOTOPHOBIA: 0
MYALGIAS: 0
STRIDOR: 0
HEADACHES: 0
ORTHOPNEA: 0
DOUBLE VISION: 0
SPUTUM PRODUCTION: 0
SHORTNESS OF BREATH: 1
DIARRHEA: 0
FOCAL WEAKNESS: 0
DIAPHORESIS: 0
WHEEZING: 0

## 2023-07-05 ASSESSMENT — 6 MINUTE WALK TEST (6MWT)
PERCEIVED FATIGUE AT 1 MIN: 0
SITTING BLOOD PRESSURE: 120/78
TOTAL REST TIME: 60
PERCEIVED FATIGUE AT 2 MIN: 0
SAO2 AT 1 MIN: 99
BLOOD PRESSURE AT 1 MIN: 124/82
PERCEIVED BREATHLESSNESS AT 2 MIN: 4
NUMBER OF RESTS: 1
SAO2 AT 4 MIN: 85
PERCEIVED FATIGUE AT 1 MIN: 0
SAO2 AT 1 MIN: 96
PERCEIVED BREATHLESSNESS AT 2 MIN: 4
SAO2 AT 5 MIN: 99
HEART RATE AT 1 MIN: 115
BLOOD PRESSURE: LEFT ARM
SAO2 AT 3 MIN: 87
HEART RATE AT 1 MIN: 94
PERCEIVED FATIGUE AT 3 MIN: 0
SAO2 AT 6 MIN: 99
PERCEIVED BREATHLESSNESS AT 3 MIN: 4
AMBULATES WITH O2: WITHOUT O2
O2 SAT PERCENT ROOM AIR: 97
HEART RATE AT 5 MIN: 98
COMMENTS: ROOM AIR
HEART RATE AT 4 MIN: 126
PERCENT OF NORMAL WALKED: 38
PERCEIVED FATIGUE AT 2 MIN: 0
SAO2 AT 2 MIN: 94
PERCEIVED BREATHLESSNESS AT 4 MIN: 4
PERCEIVED FATIGUE AT 5 MIN: 0
COMMENTS: ROOM AIR
COMMENTS: ROOM AIR
PERCEIVED BREATHLESSNESS AT 6 MIN: 4
PERCEIVED FATIGUE AT 6 MIN: 0
HEART RATE AT 2 MIN: 117
HEART RATE AT 2 MIN: 94
PERCEIVED BREATHLESSNESS AT 1 MIN: 4
PERCEIVED BREATHLESSNESS AT 5 MIN: 4
HEART RATE AT 3 MIN: 125
PERCEIVED BREATHLESSNESS AT 1 MIN: 4
SAO2 AT 2 MIN: 99
HEART RATE AT 6 MIN: 105
HEART RATE: 84

## 2023-07-05 ASSESSMENT — PATIENT HEALTH QUESTIONNAIRE - PHQ9: CLINICAL INTERPRETATION OF PHQ2 SCORE: 0

## 2023-07-05 ASSESSMENT — FIBROSIS 4 INDEX: FIB4 SCORE: 0.84

## 2023-07-05 NOTE — PROCEDURES
Test started on Room Air. In minute 4 added 02 to 2 lpm pulse and patient finished test on 2 lpm 02 pulse.    Interpretation:  There is significant desaturation with ambulation with patient dropping form 97% at rest on RA to 87% at 3 minutes. SPO2 normalizes with supplemental O2 at 2LPM pulsed for duration of the study. Distance walked is 600 ft or 38% predicted.

## 2023-07-05 NOTE — TELEPHONE ENCOUNTER
Have we ever prescribed this med? Yes.    Last OV: 8/4/22    Next OV: 9/1/23      Medications: modafinil (PROVIGIL) 200 MG Tab

## 2023-07-05 NOTE — PROGRESS NOTES
Chief Complaint   Patient presents with    Follow-Up     LAST SEEN 1/30/23, 6 MWT 7/5/23         HPI: This patient is a 37 y.o. male whom is followed in our clinic for respiratory failure and asthma last seen by me on 1/30/23.  PMHx is significant for childhood asthma and spastic cerebral palsy c/b spinal spasms for which he has a baclofen pump in place, narcolepsy and obstructive sleep apnea currently followed by our sleep clinic, hypothyroid and pericarditis diagnosed in 2015 after which patient has been dependent on supplemental O2 with no clear cause. He is a former tobacco smoker with roughly 10-pack-year history and quit 14 years ago.  He does use marijuana via vape pen for pain.  He is currently on disability therefore does not work.  He has good control of asthma on flovent 100 with almost no need for albuterol.  He cannot identify any specific triggers to his asthma and denies significant allergy symptoms.  Pulmonary function testing from October 2022 showed mildly reduced FEV1 prebronchodilator at 77% predicted with a normal ratio, significant bronchodilator response, normal total lung capacity with mild air trapping and elevated DLCO all consistent with reactive airways disease.  A chest x-ray from September 2022 was read as normal. No CT chest. TTE from 10/2021 showed normal LV systolic and diastolic function, normal RV size and function.  No evidence of intra atrial shunting.  Overall he is doing well. No asthma exacerbations. He was seen in February at [facility] with N/V. Lung bases were clear. He recently had spinal MRI with severe degenerative disease.    Past Medical History:   Diagnosis Date    ASTHMA     Back pain     Cerebral palsy (HCC)     Chickenpox     Daytime sleepiness     GERD (gastroesophageal reflux disease)     Hypothyroidism     Narcolepsy     Pain     cerebral palsy,    Rash     Rhinitis     Shortness of breath     Sleep apnea     Unspecified disorder of thyroid     Wheezing         Social History     Socioeconomic History    Marital status: Single     Spouse name: Not on file    Number of children: Not on file    Years of education: Not on file    Highest education level: Not on file   Occupational History    Not on file   Tobacco Use    Smoking status: Former     Packs/day: 1.00     Years: 14.00     Pack years: 14.00     Types: Cigarettes     Quit date: 1/1/2008     Years since quitting: 15.5     Passive exposure: Past    Smokeless tobacco: Never   Vaping Use    Vaping Use: Every day    Substances: THC, CBD    Devices: Disposable, Pre-filled or refillable cartridge    Passive vaping exposure: Yes   Substance and Sexual Activity    Alcohol use: Not Currently     Alcohol/week: 1.8 oz     Types: 3 Standard drinks or equivalent per week    Drug use: Yes     Types: Marijuana, Inhaled     Comment: vape pen rather than smoking the medical marijuana    Sexual activity: Not on file   Other Topics Concern    Not on file   Social History Narrative    Not on file     Social Determinants of Health     Financial Resource Strain: Not on file   Food Insecurity: Not on file   Transportation Needs: Not on file   Physical Activity: Not on file   Stress: Not on file   Social Connections: Not on file   Intimate Partner Violence: Not on file   Housing Stability: Not on file       Family History   Problem Relation Age of Onset    Sleep Apnea Maternal Uncle        Current Outpatient Medications on File Prior to Visit   Medication Sig Dispense Refill    amphetamine-dextroamphetamine (ADDERALL) 10 MG Tab Take 1 Tablet by mouth 3 times a day for 30 days. Indications: Recurring Sleep Episodes During the Day 90 Tablet 0    modafinil (PROVIGIL) 200 MG Tab Take 1 Tablet by mouth 2 times a day for 30 days. Indications: Drowsiness, Recurring Sleep Episodes During the Day 60 Tablet 0    albuterol (PROVENTIL) 2.5mg/3ml Nebu Soln solution for nebulization Take 3 mL by nebulization every four hours as needed for Shortness  of Breath. 120 mL 11    fluticasone (FLOVENT HFA) 110 MCG/ACT Aerosol Inhale 2 Puffs 2 times a day. Use spacer. Rinse mouth after each use. 3 Each 3    albuterol (VENTOLIN HFA) 108 (90 Base) MCG/ACT Aero Soln inhalation aerosol Inhale 2 Puffs every four hours as needed for Shortness of Breath. 1 Each 11    oxyCODONE-acetaminophen (PERCOCET) 7.5-325 MG per tablet TAKE 1 ORAL TABLET FOUR TIMES A DAY. DNF UNTIL 01/18/2022- #120 FOR 30 DAYS      ondansetron (ZOFRAN ODT) 4 MG TABLET DISPERSIBLE Take 4 mg by mouth every 8 hours as needed for Nausea/Vomiting.      pantoprazole (PROTONIX) 40 MG Tablet Delayed Response Take 40 mg by mouth every day.      lidocaine (XYLOCAINE) 5 % Ointment Apply  to affected area(s) as needed.      testosterone (TESTIM,ANDROGEL) 50 MG/5GM (1%) GEL gel Apply 5 g to skin as directed 2 Times a Day.      levothyroxine (SYNTHROID) 100 MCG TABS Take 100 mcg by mouth Every morning on an empty stomach.      methocarbamol (ROBAXIN) 750 MG TABS Take 750 mg by mouth 3 times a day.      gabapentin (NEURONTIN) 300 MG CAPS Take 300 mg by mouth 3 times a day.      vitamin D, Ergocalciferol, (DRISDOL) 47412 UNITS CAPS capsule Take  by mouth every 7 days.      NON SPECIFIED Oxygen 2L N/C at [facility]       No current facility-administered medications on file prior to visit.       Patient has no known allergies.      ROS:   Review of Systems   Constitutional:  Negative for chills, diaphoresis, fever, malaise/fatigue and weight loss.   HENT:  Negative for congestion, ear discharge, ear pain, hearing loss, nosebleeds, sinus pain, sore throat and tinnitus.    Eyes:  Negative for blurred vision, double vision, photophobia, pain, discharge and redness.   Respiratory:  Positive for shortness of breath. Negative for cough, hemoptysis, sputum production, wheezing and stridor.    Cardiovascular:  Negative for chest pain, palpitations, orthopnea, claudication, leg swelling and PND.   Gastrointestinal:  Negative for abdominal  "pain, constipation, diarrhea, heartburn, nausea and vomiting.   Genitourinary:  Negative for dysuria and urgency.   Musculoskeletal:  Negative for back pain, falls, joint pain, myalgias and neck pain.   Skin:  Negative for itching and rash.   Neurological:  Negative for dizziness, tremors, speech change, focal weakness, weakness and headaches.   Endo/Heme/Allergies:  Negative for environmental allergies.   Psychiatric/Behavioral:  Negative for depression.        /78 (BP Location: Right arm, Patient Position: Sitting, BP Cuff Size: Adult)   Pulse 85   Ht 1.727 m (5' 8\")   Wt 79.4 kg (175 lb)   SpO2 99%   Physical Exam  Vitals reviewed.   Constitutional:       General: He is not in acute distress.     Appearance: Normal appearance. He is normal weight.   HENT:      Head: Normocephalic and atraumatic.      Right Ear: External ear normal.      Left Ear: External ear normal.      Nose: Nose normal. No congestion.      Mouth/Throat:      Mouth: Mucous membranes are moist.      Pharynx: Oropharynx is clear. No oropharyngeal exudate.   Eyes:      General: No scleral icterus.     Extraocular Movements: Extraocular movements intact.      Conjunctiva/sclera: Conjunctivae normal.      Pupils: Pupils are equal, round, and reactive to light.   Cardiovascular:      Rate and Rhythm: Normal rate and regular rhythm.      Heart sounds: Normal heart sounds. No murmur heard.     No gallop.   Pulmonary:      Effort: Pulmonary effort is normal. No respiratory distress.      Breath sounds: Normal breath sounds. No wheezing or rales.   Abdominal:      General: There is no distension.      Palpations: Abdomen is soft.   Musculoskeletal:         General: Normal range of motion.      Cervical back: Normal range of motion and neck supple.      Right lower leg: No edema.      Left lower leg: No edema.   Skin:     General: Skin is warm and dry.      Findings: No rash.   Neurological:      Mental Status: He is alert and oriented to " person, place, and time.      Cranial Nerves: No cranial nerve deficit.   Psychiatric:         Mood and Affect: Mood normal.         Behavior: Behavior normal.       PFTs as reviewed by me personally: as per hPI    Imaging as reviewed by me personally:  as per HPI    Assessment:  1. Chronic respiratory failure with hypoxia (HCC)  CT-CHEST (THORAX) W/O    PULMONARY FUNCTION TESTS -Test requested: Complete Pulmonary Function Test      2. Moderate persistent asthma without complication  CT-CHEST (THORAX) W/O    PULMONARY FUNCTION TESTS -Test requested: Complete Pulmonary Function Test      3. Vapes non-nicotine containing substance        4. JENNIFER (obstructive sleep apnea)            Plan:  Chronic and cause is still not clear to me. I would like to order CT chest and update PFT. Otherwise pt does need O2 at 2LPM with activity. Pt is compliant with and benefiting from O2 at 2lPM  Chronic, mild to moderate, stable. Continue flovent, prn Elana.  Counseled on complete tobacco cessation.  Compliant with and benefiting from therapy.   Return in about 6 months (around 1/5/2024) for pulmonary function test at time of f/u, CT chest.

## 2023-07-07 ENCOUNTER — TELEPHONE (OUTPATIENT)
Dept: SLEEP MEDICINE | Facility: MEDICAL CENTER | Age: 38
End: 2023-07-07
Payer: MEDICARE

## 2023-07-07 DIAGNOSIS — J96.11 CHRONIC RESPIRATORY FAILURE WITH HYPOXIA (HCC): ICD-10-CM

## 2023-07-07 NOTE — TELEPHONE ENCOUNTER
Dr. Jones, please place DME o2/equipment order that was discussed at patient's appointment.    DME: Gunnar Rutledge

## 2023-07-11 ENCOUNTER — TELEPHONE (OUTPATIENT)
Dept: HEALTH INFORMATION MANAGEMENT | Facility: OTHER | Age: 38
End: 2023-07-11
Payer: MEDICARE

## 2023-08-08 DIAGNOSIS — G47.19 EXCESSIVE DAYTIME SLEEPINESS: ICD-10-CM

## 2023-08-08 DIAGNOSIS — G47.419 NARCOLEPSY WITHOUT CATAPLEXY: ICD-10-CM

## 2023-08-08 RX ORDER — MODAFINIL 200 MG/1
200 TABLET ORAL 2 TIMES DAILY
Qty: 60 TABLET | Refills: 0 | Status: SHIPPED | OUTPATIENT
Start: 2023-08-08 | End: 2023-09-07

## 2023-08-08 RX ORDER — DEXTROAMPHETAMINE SACCHARATE, AMPHETAMINE ASPARTATE, DEXTROAMPHETAMINE SULFATE AND AMPHETAMINE SULFATE 2.5; 2.5; 2.5; 2.5 MG/1; MG/1; MG/1; MG/1
10 TABLET ORAL 3 TIMES DAILY
Qty: 90 TABLET | Refills: 0 | Status: SHIPPED | OUTPATIENT
Start: 2023-08-08 | End: 2023-09-08 | Stop reason: SDUPTHER

## 2023-08-08 NOTE — TELEPHONE ENCOUNTER
Have we ever prescribed this med? Yes.  If yes, what date? 07/11/2023 PEARL Shoemaker APRN     Last OV: 08/04/2022 SCOTT Shoemaker APRN     Next OV: 09/01/2023 Dr. Bhakta     DX:   Narcolepsy without cataplexy       Excessive daytime sleepiness           Medications: amphetamine-dextroamphetamine (ADDERALL) 10 MG Tab

## 2023-08-08 NOTE — TELEPHONE ENCOUNTER
Have we ever prescribed this med? Yes.  If yes, what date? 07/05/2023 PEARL Shoemaker APRN     Last OV: 08/04/2022 SCOTT Shoemaker APRN     Next OV: 09/01/2023 Dr. Bhakta     DX:   Narcolepsy without cataplexy       Excessive daytime sleepiness           Medications: modafinil (PROVIGIL) 200 MG Tab

## 2023-09-01 ENCOUNTER — OFFICE VISIT (OUTPATIENT)
Dept: SLEEP MEDICINE | Facility: MEDICAL CENTER | Age: 38
End: 2023-09-01
Attending: STUDENT IN AN ORGANIZED HEALTH CARE EDUCATION/TRAINING PROGRAM
Payer: MEDICARE

## 2023-09-01 VITALS
HEART RATE: 71 BPM | BODY MASS INDEX: 26.37 KG/M2 | SYSTOLIC BLOOD PRESSURE: 116 MMHG | WEIGHT: 174 LBS | OXYGEN SATURATION: 99 % | HEIGHT: 68 IN | RESPIRATION RATE: 16 BRPM | DIASTOLIC BLOOD PRESSURE: 88 MMHG

## 2023-09-01 DIAGNOSIS — G47.419 NARCOLEPSY WITHOUT CATAPLEXY: ICD-10-CM

## 2023-09-01 DIAGNOSIS — J96.11 CHRONIC RESPIRATORY FAILURE WITH HYPOXIA (HCC): ICD-10-CM

## 2023-09-01 DIAGNOSIS — G47.33 OSA (OBSTRUCTIVE SLEEP APNEA): ICD-10-CM

## 2023-09-01 DIAGNOSIS — G47.19 EXCESSIVE DAYTIME SLEEPINESS: ICD-10-CM

## 2023-09-01 PROCEDURE — 3079F DIAST BP 80-89 MM HG: CPT | Performed by: STUDENT IN AN ORGANIZED HEALTH CARE EDUCATION/TRAINING PROGRAM

## 2023-09-01 PROCEDURE — 99212 OFFICE O/P EST SF 10 MIN: CPT | Performed by: STUDENT IN AN ORGANIZED HEALTH CARE EDUCATION/TRAINING PROGRAM

## 2023-09-01 PROCEDURE — 3074F SYST BP LT 130 MM HG: CPT | Performed by: STUDENT IN AN ORGANIZED HEALTH CARE EDUCATION/TRAINING PROGRAM

## 2023-09-01 PROCEDURE — 99214 OFFICE O/P EST MOD 30 MIN: CPT | Performed by: STUDENT IN AN ORGANIZED HEALTH CARE EDUCATION/TRAINING PROGRAM

## 2023-09-01 ASSESSMENT — FIBROSIS 4 INDEX: FIB4 SCORE: 0.84

## 2023-09-01 NOTE — PATIENT INSTRUCTIONS
-Start Sunosi 75 mg once in the morning  -After 3 days of using 75 mg if it is not effective and no side effects are noted may increase to 150 mg of Sunosi once a day  -If doing well at 150 mg of Sunosi continue once daily  -Please message in MyChart if doing well at 150 mg Sunosi in place of Provigil

## 2023-09-01 NOTE — PROGRESS NOTES
[facility] Follow-up Visit    CC: Follow-up regarding management of obstructive sleep apnea and narcolepsy type II      HPI:  Donato Valle is a 37 y.o.male  with CP, chronic pain, GERD, chronic respiratory failure on supplemental oxygen 2 L/min, moderate persistent asthma, narcolepsy without cataplexy and obstructive sleep apnea on CPAP.  Presents today to follow-up regarding management of obstructive sleep apnea and narcolepsy type II.    He is using supplemental oxygen 24 hours a day 2 L/min.  He is using it with his CPAP at night.  Overall finds CPAP comfortable.  Finds the mask is fitting well.    He is having difficulty regarding his medications for his narcolepsy.  He is taking Adderall 10 mg 3 times a day in addition he was on armodafinil l but had difficulty getting it filled and so he switched to Provigil.  He is currently taking Provigil 200 mg twice a day along with his Adderall 3 times a day.  Doing so he states he is able to stay awake safely and drive he does not have any sleepiness while doing activities but he states if he is visiting with friends or family he will often start to nod to sleep.  He found when he was on armodafinil he had more alertness during the day compared to his Provigil.  Due to the difficulty obtaining modafinil and armodafinil discussed potential of trying Sunosi.    ESS 10    Denies drowsiness while driving.     DME provider: Gunnar Rtuledge  Device: airsense 11  Mask: fullface   Aerophagia: No   Snoring: No   Dry mouth: Yes  Leak: No   Skin irritation: No   Chin strap: No     Sleep History  Previously diagnosed outside facility Dr. Townsend's office around 2015 2016 which showed narcolepsy without cataplexy.  3/22/2022 PSG showed mild obstructive sleep apnea overall AHI of 5.6, minimum oxygen saturation 86%, time at or below 88% saturation of 18.7 minutes.  Sleep onset REM period was seen during PSG  3/23/2022 MSLT showed mean sleep latency of 2.2 minutes and 3  sleep onset REM periods were identified consistent with a diagnosis of narcolepsy.    Patient Active Problem List    Diagnosis Date Noted    Chronic pericarditis 02/15/2022    Gastroesophageal reflux 02/15/2022    Spastic cerebral palsy (HCC) 02/15/2022    Chronic pain 10/07/2015    Lumbosacral spondylosis without myelopathy 05/08/2015       Past Medical History:   Diagnosis Date    ASTHMA     Back pain     Cerebral palsy (HCC)     Chickenpox     Daytime sleepiness     GERD (gastroesophageal reflux disease)     Hypothyroidism     Narcolepsy     Pain     cerebral palsy,    Rash     Rhinitis     Shortness of breath     Sleep apnea     Unspecified disorder of thyroid     Wheezing         Past Surgical History:   Procedure Laterality Date    PUMP INSERT/REMOVE Left 10/7/2015    Procedure: PUMP INSERT--Baclofen;  Surgeon: Yifan Sinclair;  Location: SURGERY [facility];  Service:     NEURO DEST FACET C/T W/IG SNGL Left 5/8/2015    Procedure: LEFT T11-12 FACET JOINT NERVE ABLATIONFLUORO, PRONE, RF PROTOCOL;  Surgeon: Gino Gan D.O.;  Location: SURGERY [facility];  Service: Pain Management    NEURO DEST FACET C/T W/IG ADDL  5/8/2015    Procedure: NEURO DEST FACET C/T W/IG ADDL;  Surgeon: Gino Gan D.O.;  Location: SURGERY [facility];  Service: Pain Management    NEURO DEST FACET L/S W/IG SNGL  5/8/2015    Procedure: NEURO DEST FACET L/S W/IG SNGL;  Surgeon: Gino Gan D.O.;  Location: SURGERY [facility];  Service: Pain Management       Family History   Problem Relation Age of Onset    Sleep Apnea Maternal Uncle        Social History     Socioeconomic History    Marital status: Single     Spouse name: Not on file    Number of children: Not on file    Years of education: Not on file    Highest education level: Not on file   Occupational History    Not on file   Tobacco Use    Smoking status: Former     Current packs/day: 0.00     Average packs/day: 1 pack/day for 14.0 years  (14.0 ttl pk-yrs)     Types: Cigarettes     Start date: 1/1/1994     Quit date: 1/1/2008     Years since quitting: 15.6     Passive exposure: Past    Smokeless tobacco: Never   Vaping Use    Vaping Use: Every day    Substances: THC, CBD    Devices: Disposable, Pre-filled or refillable cartridge    Passive vaping exposure: Yes   Substance and Sexual Activity    Alcohol use: Not Currently     Alcohol/week: 1.8 oz     Types: 3 Standard drinks or equivalent per week    Drug use: Yes     Types: Marijuana, Inhaled     Comment: vape pen rather than smoking the medical marijuana    Sexual activity: Not on file   Other Topics Concern    Not on file   Social History Narrative    Not on file     Social Determinants of Health     Financial Resource Strain: Not on file   Food Insecurity: Not on file   Transportation Needs: Not on file   Physical Activity: Not on file   Stress: Not on file   Social Connections: Not on file   Intimate Partner Violence: Not on file   Housing Stability: Not on file       Current Outpatient Medications   Medication Sig Dispense Refill    Solriamfetol HCl 150 MG Tab Take 150 mg by mouth every day for 14 days. 14 Tablet 0    Solriamfetol HCl 75 MG Tab Take 75 mg by mouth every day for 7 days. 7 Tablet 0    modafinil (PROVIGIL) 200 MG Tab Take 1 Tablet by mouth 2 times a day for 30 days. Indications: Drowsiness, Recurring Sleep Episodes During the Day 60 Tablet 0    amphetamine-dextroamphetamine (ADDERALL) 10 MG Tab Take 1 Tablet by mouth 3 times a day for 30 days. Indications: Recurring Sleep Episodes During the Day 90 Tablet 0    albuterol (PROVENTIL) 2.5mg/3ml Nebu Soln solution for nebulization Take 3 mL by nebulization every four hours as needed for Shortness of Breath. 120 mL 11    fluticasone (FLOVENT HFA) 110 MCG/ACT Aerosol Inhale 2 Puffs 2 times a day. Use spacer. Rinse mouth after each use. 3 Each 3    albuterol (VENTOLIN HFA) 108 (90 Base) MCG/ACT Aero Soln inhalation aerosol Inhale 2 Puffs  "every four hours as needed for Shortness of Breath. 1 Each 11    oxyCODONE-acetaminophen (PERCOCET) 7.5-325 MG per tablet TAKE 1 ORAL TABLET FOUR TIMES A DAY. DNF UNTIL 01/18/2022- #120 FOR 30 DAYS      ondansetron (ZOFRAN ODT) 4 MG TABLET DISPERSIBLE Take 4 mg by mouth every 8 hours as needed for Nausea/Vomiting.      NON SPECIFIED Oxygen 2L N/C at noc      pantoprazole (PROTONIX) 40 MG Tablet Delayed Response Take 40 mg by mouth every day.      lidocaine (XYLOCAINE) 5 % Ointment Apply  to affected area(s) as needed.      testosterone (TESTIM,ANDROGEL) 50 MG/5GM (1%) GEL gel Apply 5 g to skin as directed 2 Times a Day.      levothyroxine (SYNTHROID) 100 MCG TABS Take 100 mcg by mouth Every morning on an empty stomach.      methocarbamol (ROBAXIN) 750 MG TABS Take 750 mg by mouth 3 times a day.      gabapentin (NEURONTIN) 300 MG CAPS Take 300 mg by mouth 3 times a day.      vitamin D, Ergocalciferol, (DRISDOL) 84772 UNITS CAPS capsule Take  by mouth every 7 days.       No current facility-administered medications for this visit.        ALLERGIES: Patient has no known allergies.    ROS  Constitutional: Denies fevers, Denies weight changes  Ears/Nose/Throat/Mouth: Denies nasal congestion or sore throat   Cardiovascular: Denies chest pain  Respiratory: Denies shortness of breath, Denies cough  Gastrointestinal/Hepatic: Denies nausea, vomiting  Sleep: see HPI      PHYSICAL EXAM  /88 (BP Location: Left arm, Patient Position: Sitting, BP Cuff Size: Large adult)   Pulse 71   Resp 16   Ht 1.727 m (5' 8\")   Wt 78.9 kg (174 lb)   SpO2 99% Comment: 2L 24/7  BMI 26.46 kg/m²   Appearance: Well-nourished, well-developed, no acute distress  Eyes:  No scleral icterus , EOMI  Musculoskeletal:  Grossly normal; digits and nails normal  Skin:  No rashes, petechiae, cyanosis  Neurologic: without focal signs; oriented to person, time, place, and purpose; judgement intact      Medical Decision Making   Assessment and " Plan  Donato Valle is a 37 y.o.male  with CP, chronic pain, GERD, chronic respiratory failure on supplemental oxygen 2 L/min, moderate persistent asthma, narcolepsy without cataplexy and obstructive sleep apnea on CPAP.  Presents today to follow-up regarding management of obstructive sleep apnea and narcolepsy type II.    The medical record was reviewed.    Obstructive sleep apnea  Diagnostic and titration nocturnal polysomnogram's, home sleep apnea tests, continuous nocturnal oximetry results, multiple sleep latency tests, and compliance reports reviewed.  Compliance data reviewed showing 80% usage > 4hours in last 30  days. Average AHI 5.0 events/hour. Pt continues to use and benefit from machine.      Current Settings auto CPAP 4-7 due to running at the higher end of his pressure range will increase max pressure to 8 cm of water.        PLAN:   -Order placed for mask and supplies   -Advised to reach out via MyChart with questions     Has been advised to continue the current CPAP, clean equipment frequently, and get new mask and supplies as allowed by insurance and DME. Recommend an earlier appointment, if significant treatment barriers develop.    Patients with JENNIFER are at increased risk of cardiovascular disease including coronary artery disease, systemic arterial hypertension, pulmonary arterial hypertension, cardiac arrythmias, and stroke. The patient was advised to avoid driving a motor vehicle when drowsy.    Positive airway pressure will favorably impact many of the adverse conditions and effects provoked by JENNIFER.    Narcolepsy without cataplexy  Patient is benefiting from stimulant therapy with Adderall and a wake promoting agent Provigil.  However he is noticing that he is having sleepiness at times when he is not active.  In addition he is having difficulty obtaining his current medication refills due to availability in [place].  Reviewed alternatives to armodafinil and modafinil.  Patient is  open to trying Sunosi in place of modafinil.  Reviewed mechanism of action of Sunosi as well as side effects and benefits.    Patient is open to trying Sunosi.  Advised that we may try a trial.  With Sunosi samples to see if he notices effectiveness.  Advised he should not take modafinil or armodafinil with taking Sunosi.  Patient may continue to take his Adderall as needed for alertness.    Plan  -Continue Adderall 10 mg 3 times a day  -Start Sunosi 75 mg once daily increasing to 150 mg after 3 days if 75 mg is not effective  -Advised patient to reach out MyChart if Sunosi is effective  -Hold taking modafinil while taking Sunosi  -Samples given at today's visit 7 tablets of 75 mg and 14 tablets of 150 mg    Have advised the patient to follow up with the appropriate healthcare practitioners for all other medical problems and issues.    Return in about 6 months (around 3/1/2024).      Please note portions of this record was created using voice recognition software. I have made every reasonable attempt to correct obvious errors, but I expect that there are errors of grammar and possibly content I did not discover before finalizing the note.

## 2023-09-08 ENCOUNTER — HOSPITAL ENCOUNTER (OUTPATIENT)
Dept: RADIOLOGY | Facility: MEDICAL CENTER | Age: 38
End: 2023-09-08
Attending: INTERNAL MEDICINE
Payer: MEDICARE

## 2023-09-08 DIAGNOSIS — J96.11 CHRONIC RESPIRATORY FAILURE WITH HYPOXIA (HCC): ICD-10-CM

## 2023-09-08 RX ORDER — ALBUTEROL SULFATE 2.5 MG/3ML
2.5 SOLUTION RESPIRATORY (INHALATION) EVERY 4 HOURS PRN
Qty: 120 ML | Refills: 11 | Status: SHIPPED | OUTPATIENT
Start: 2023-09-08

## 2023-09-11 ENCOUNTER — TELEPHONE (OUTPATIENT)
Dept: SLEEP MEDICINE | Facility: MEDICAL CENTER | Age: 38
End: 2023-09-11

## 2023-09-11 NOTE — TELEPHONE ENCOUNTER
Did try to call patient no answer. Per Dr. Bhakta Patient must be seen in office for further refills for Adderall.

## 2023-09-14 ENCOUNTER — PATIENT MESSAGE (OUTPATIENT)
Dept: SLEEP MEDICINE | Facility: MEDICAL CENTER | Age: 38
End: 2023-09-14
Payer: MEDICARE

## 2023-09-15 NOTE — PATIENT COMMUNICATION
Requesting prior authorization for Sunosi 150MG tablets   [place] Outcome CaseId:[id number];Status:Approved;Review Type:Prior Auth;Coverage Start Date:08/15/2023;Coverage End Date:09/14/2024;    Quantity of 30 for a day supply of 30       [facility] [address] [place] [id number]  Phone: [phone] Fax: [phone]    Has been informed

## 2023-09-25 ENCOUNTER — TELEPHONE (OUTPATIENT)
Dept: SLEEP MEDICINE | Facility: MEDICAL CENTER | Age: 38
End: 2023-09-25
Payer: MEDICARE

## 2023-09-25 NOTE — TELEPHONE ENCOUNTER
Cover My Meds Medication Prior Auth Approval Sunosi 150MG    CaseId:[id number];Status:Approved;Review Type:Prior Auth;Coverage Start Date:08/15/2023;Coverage End Date:09/14/2024;

## 2023-10-05 DIAGNOSIS — G47.33 OSA (OBSTRUCTIVE SLEEP APNEA): ICD-10-CM

## 2023-10-05 DIAGNOSIS — G47.419 NARCOLEPSY WITHOUT CATAPLEXY: ICD-10-CM

## 2023-10-05 DIAGNOSIS — G47.19 EXCESSIVE DAYTIME SLEEPINESS: ICD-10-CM

## 2023-10-06 RX ORDER — DEXTROAMPHETAMINE SACCHARATE, AMPHETAMINE ASPARTATE, DEXTROAMPHETAMINE SULFATE AND AMPHETAMINE SULFATE 2.5; 2.5; 2.5; 2.5 MG/1; MG/1; MG/1; MG/1
10 TABLET ORAL 3 TIMES DAILY
Qty: 90 TABLET | Refills: 0 | Status: SHIPPED | OUTPATIENT
Start: 2023-10-06 | End: 2023-11-02 | Stop reason: SDUPTHER

## 2023-10-06 NOTE — TELEPHONE ENCOUNTER
Have we ever prescribed this med? Yes.  If yes, what date? 09/13/23    Last OV: 09/01/23 with Dr Bhakta    Next OV: 03/08/24 with Dr Bhakta    DX: Narcolepsy without cataplexy [G47.419]; Excessive daytime sleepiness [G47.19]; JENNIFER (obstructive sleep apnea) [G47.33]    Medications:   Requested Prescriptions     Pending Prescriptions Disp Refills    amphetamine-dextroamphetamine (ADDERALL) 10 MG Tab 90 Tablet 0     Sig: Take 1 Tablet by mouth 3 times a day for 30 days. Indications: Recurring Sleep Episodes During the Day    Solriamfetol HCl 150 MG Tab 30 Tablet 1     Sig: Take 1 Tablet by mouth every day for 30 days.

## 2023-10-30 ENCOUNTER — PATIENT MESSAGE (OUTPATIENT)
Dept: SLEEP MEDICINE | Facility: MEDICAL CENTER | Age: 38
End: 2023-10-30
Payer: MEDICARE

## 2023-10-30 DIAGNOSIS — G47.419 NARCOLEPSY WITHOUT CATAPLEXY: ICD-10-CM

## 2023-10-31 RX ORDER — ARMODAFINIL 250 MG/1
1 TABLET ORAL EVERY MORNING
Qty: 30 TABLET | Refills: 0 | Status: SHIPPED | OUTPATIENT
Start: 2023-10-31 | End: 2023-11-28 | Stop reason: SDUPTHER

## 2023-11-28 ENCOUNTER — APPOINTMENT (OUTPATIENT)
Dept: DERMATOLOGY | Facility: IMAGING CENTER | Age: 38
End: 2023-11-28
Payer: MEDICARE

## 2023-11-28 DIAGNOSIS — G47.419 NARCOLEPSY WITHOUT CATAPLEXY: ICD-10-CM

## 2023-11-28 DIAGNOSIS — G47.19 EXCESSIVE DAYTIME SLEEPINESS: ICD-10-CM

## 2023-11-29 RX ORDER — ARMODAFINIL 250 MG/1
1 TABLET ORAL EVERY MORNING
Qty: 30 TABLET | Refills: 0 | Status: SHIPPED | OUTPATIENT
Start: 2023-11-29 | End: 2024-01-02 | Stop reason: SDUPTHER

## 2023-11-29 RX ORDER — DEXTROAMPHETAMINE SACCHARATE, AMPHETAMINE ASPARTATE, DEXTROAMPHETAMINE SULFATE AND AMPHETAMINE SULFATE 2.5; 2.5; 2.5; 2.5 MG/1; MG/1; MG/1; MG/1
10 TABLET ORAL 3 TIMES DAILY
Qty: 90 TABLET | Refills: 0 | Status: SHIPPED | OUTPATIENT
Start: 2023-11-29 | End: 2024-01-02 | Stop reason: SDUPTHER

## 2023-11-29 NOTE — TELEPHONE ENCOUNTER
Have we ever prescribed this med? Yes.  If yes, what date?  11/3/2023    Last OV: 9/1/2023 - Dr. Bhakta    Next OV: 3/8/2024- Dr. Bhakta     DX: Narcolepsy without cataplexy [G47.419]; Excessive daytime sleepiness [G47.19]     Medications: amphetamine-dextroamphetamine (ADDERALL) 10 MG Tab             Have we ever prescribed this med? Yes.  If yes, what date? 10/31/2023    Last OV: 9/1/2023 - Dr. Bhakta    Next OV: 3/8/2024- Dr. Bhakta    DX: Narcolepsy without cataplexy [G47.419]     Medications: Armodafinil (NUVIGIL) 250 MG Tab

## 2024-01-02 DIAGNOSIS — G47.19 EXCESSIVE DAYTIME SLEEPINESS: ICD-10-CM

## 2024-01-02 DIAGNOSIS — G47.419 NARCOLEPSY WITHOUT CATAPLEXY: ICD-10-CM

## 2024-01-03 RX ORDER — ARMODAFINIL 250 MG/1
1 TABLET ORAL EVERY MORNING
Qty: 30 TABLET | Refills: 0 | Status: SHIPPED | OUTPATIENT
Start: 2024-01-03 | End: 2024-02-01 | Stop reason: SDUPTHER

## 2024-01-03 RX ORDER — DEXTROAMPHETAMINE SACCHARATE, AMPHETAMINE ASPARTATE, DEXTROAMPHETAMINE SULFATE AND AMPHETAMINE SULFATE 2.5; 2.5; 2.5; 2.5 MG/1; MG/1; MG/1; MG/1
10 TABLET ORAL 3 TIMES DAILY
Qty: 90 TABLET | Refills: 0 | Status: SHIPPED | OUTPATIENT
Start: 2024-01-03 | End: 2024-02-01 | Stop reason: SDUPTHER

## 2024-01-03 NOTE — TELEPHONE ENCOUNTER
Have we ever prescribed this med? Yes.  If yes, what date? 11/29/2023     Last OV: 9/1/2023 DARIEN Bhakta M.D.    Next OV: 3/8/2024 DARIEN Bhakta M.D.    DX: Narcolepsy without cataplexy [G47.419] ; Excessive daytime sleepiness [G47.19]     Medications: Armodafinil (NUVIGIL) 250 MG Tab    amphetamine-dextroamphetamine (ADDERALL) 10 MG Tab

## 2024-01-22 ENCOUNTER — OFFICE VISIT (OUTPATIENT)
Dept: DERMATOLOGY | Facility: IMAGING CENTER | Age: 39
End: 2024-01-22
Payer: MEDICARE

## 2024-01-22 DIAGNOSIS — L21.9 SEBORRHEIC DERMATITIS: ICD-10-CM

## 2024-01-22 PROCEDURE — 99213 OFFICE O/P EST LOW 20 MIN: CPT | Performed by: NURSE PRACTITIONER

## 2024-01-22 RX ORDER — CLOBETASOL PROPIONATE 0.46 MG/ML
SOLUTION TOPICAL
Qty: 50 ML | Refills: 3 | Status: SHIPPED | OUTPATIENT
Start: 2024-01-22

## 2024-01-22 RX ORDER — KETOCONAZOLE 20 MG/G
CREAM TOPICAL
Qty: 30 G | Refills: 1 | Status: SHIPPED | OUTPATIENT
Start: 2024-01-22

## 2024-01-22 NOTE — PROGRESS NOTES
DERMATOLOGY NOTE  NEW VISIT       Chief complaint: Establish Care     Itchy, Red, dry patches on scalp when shave (haircut)  Also dry skin and red around nose. Pt has used Ketoconazole and did not help  Some days are worst than others        No Known Allergies     MEDICATIONS:  Medications relevant to specialty reviewed.     REVIEW OF SYSTEMS:   Positive for skin (see HPI)  Negative for fevers and chills       EXAM:  There were no vitals taken for this visit.  Constitutional: Well-developed, well-nourished, and in no distress.     A focused skin exam was performed including the affected areas of the head (including face). Notable findings on exam today listed below and/or in assessment/plan.     Localized erythema with flake and scalp to R parietal scalp and central face    IMPRESSION / PLAN:    1. Seborrheic dermatitis  Discussed course/nature of disease  Discussed supportive OTC measures for scalp  Rx's below  Follow up 4-6 weeks  - ketoconazole (NIZORAL) 2 % Cream; AAA, face, 1-2 times per day until resolved, then use 2-3 times per week for maintenance  Dispense: 30 g; Refill: 1  - clobetasol (TEMOVATE) 0.05 % external solution; Use twice day for 1-2 weeks until resolved, then 2-3 times per week as needed  Dispense: 50 mL; Refill: 3      Discussed risks, benefits, alternative treatments as well as common side effects associated with prescribed treatment, Patient verbalized understanding and agrees with plan regarding the above          Please note that this dictation was created using voice recognition software. I have made every reasonable attempt to correct obvious errors, but I expect that there are errors of grammar and possibly content that I did not discover before finalizing the note.      Return to clinic in: Return for 4-6 weeks f/u seb derm. and as needed for any new or changing skin lesions.

## 2024-01-25 ENCOUNTER — APPOINTMENT (OUTPATIENT)
Dept: SLEEP MEDICINE | Facility: MEDICAL CENTER | Age: 39
End: 2024-01-25
Attending: INTERNAL MEDICINE
Payer: MEDICARE

## 2024-02-03 ENCOUNTER — PATIENT MESSAGE (OUTPATIENT)
Dept: SLEEP MEDICINE | Facility: MEDICAL CENTER | Age: 39
End: 2024-02-03
Payer: MEDICARE

## 2024-02-05 NOTE — PATIENT COMMUNICATION
Requesting prior authorization for  Armodafinil (NUVIGIL) 250 MG Tab   PA Outcome APPROVE    Quantity of 30 for a day supply of 30   Reference #? [id number]  KEY [id number]   Dates in effect, from 01/06/2024 through 02/04/2025

## 2024-02-27 ENCOUNTER — OFFICE VISIT (OUTPATIENT)
Dept: SLEEP MEDICINE | Facility: MEDICAL CENTER | Age: 39
End: 2024-02-27
Attending: STUDENT IN AN ORGANIZED HEALTH CARE EDUCATION/TRAINING PROGRAM
Payer: MEDICARE

## 2024-02-27 ENCOUNTER — TELEPHONE (OUTPATIENT)
Dept: SLEEP MEDICINE | Facility: MEDICAL CENTER | Age: 39
End: 2024-02-27

## 2024-02-27 VITALS
HEIGHT: 68 IN | HEART RATE: 93 BPM | RESPIRATION RATE: 16 BRPM | WEIGHT: 172.2 LBS | SYSTOLIC BLOOD PRESSURE: 128 MMHG | DIASTOLIC BLOOD PRESSURE: 72 MMHG | OXYGEN SATURATION: 96 % | BODY MASS INDEX: 26.1 KG/M2

## 2024-02-27 DIAGNOSIS — G47.419 NARCOLEPSY WITHOUT CATAPLEXY: ICD-10-CM

## 2024-02-27 DIAGNOSIS — G47.19 EXCESSIVE DAYTIME SLEEPINESS: ICD-10-CM

## 2024-02-27 DIAGNOSIS — G47.33 OSA (OBSTRUCTIVE SLEEP APNEA): Primary | ICD-10-CM

## 2024-02-27 PROCEDURE — 3074F SYST BP LT 130 MM HG: CPT | Performed by: STUDENT IN AN ORGANIZED HEALTH CARE EDUCATION/TRAINING PROGRAM

## 2024-02-27 PROCEDURE — 99213 OFFICE O/P EST LOW 20 MIN: CPT | Performed by: STUDENT IN AN ORGANIZED HEALTH CARE EDUCATION/TRAINING PROGRAM

## 2024-02-27 PROCEDURE — 3078F DIAST BP <80 MM HG: CPT | Performed by: STUDENT IN AN ORGANIZED HEALTH CARE EDUCATION/TRAINING PROGRAM

## 2024-02-27 PROCEDURE — 99214 OFFICE O/P EST MOD 30 MIN: CPT | Performed by: STUDENT IN AN ORGANIZED HEALTH CARE EDUCATION/TRAINING PROGRAM

## 2024-02-27 ASSESSMENT — FIBROSIS 4 INDEX: FIB4 SCORE: 0.86

## 2024-02-27 ASSESSMENT — PATIENT HEALTH QUESTIONNAIRE - PHQ9: CLINICAL INTERPRETATION OF PHQ2 SCORE: 0

## 2024-02-27 NOTE — PROGRESS NOTES
[facility] Follow-up Visit    CC: Follow-up regarding management of narcolepsy without cataplexy and obstructive sleep apnea      HPI:  Donato Valle is a 38 y.o.male  with chronic pain, CP, GERD, chronic respiratory failure with supplemental oxygen 2 L/min, Narcolepsy type II, and obstructive sleep apnea on CPAP.  Presents today to follow-up regarding management of narcolepsy type II and obstructive sleep apnea.    He continues to use his CPAP machine regularly.  He is using supplemental oxygen 24 hours a day 2 L/min.  He finds his CPAP comfortable.  However he is interested in pursuing a different facemask.  He states there are nights where he will wake up and have part of the mask taken off at night.  He does not believe he needs a fullface mask and thinks he may be able to do with a nasal mask.    He is continuing to benefit from Nuvigil 250 mg daily.  In addition to the Nuvigil he takes Adderall 10 mg 3 times a day as needed for sleepiness.  He finds with his current regimen he is not as sleepy during the day.  He denies any drowsiness while driving.  He does notice benefit with taking the medication versus not taking the medication.    In the past he has found armodafinil more helpful to his alertness compared to Provigil.      DME provider: [facility]  Device: airsense 11  Mask: fullface   Aerophagia: No   Snoring: No   Dry mouth: Yes  Leak: No   Skin irritation: No   Chin strap: No      Sleep History  Previously diagnosed outside facility Dr. Townsend's office around 2015 2016 which showed narcolepsy without cataplexy.  3/22/2022 PSG showed mild obstructive sleep apnea overall AHI of 5.6, minimum oxygen saturation 86%, time at or below 88% saturation of 18.7 minutes.  Sleep onset REM period was seen during PSG  3/23/2022 MSLT showed mean sleep latency of 2.2 minutes and 3 sleep onset REM periods were identified consistent with a diagnosis of narcolepsy.    Patient Active Problem List     Diagnosis Date Noted    Chronic pericarditis 02/15/2022    Gastroesophageal reflux 02/15/2022    Spastic cerebral palsy (HCC) 02/15/2022    Chronic pain 10/07/2015    Lumbosacral spondylosis without myelopathy 2015       Past Medical History:   Diagnosis Date    ASTHMA     Back pain     Cerebral palsy (HCC)     Chickenpox     Daytime sleepiness     GERD (gastroesophageal reflux disease)     Hypothyroidism     Narcolepsy     Pain     cerebral palsy,    Rash     Rhinitis     Shortness of breath     Sleep apnea     Unspecified disorder of thyroid     Wheezing         Past Surgical History:   Procedure Laterality Date    PUMP INSERT/REMOVE Left 10/7/2015    Procedure: PUMP INSERT--Baclofen;  Surgeon: Yifan Sinclair;  Location: SURGERY [facility];  Service:     NEURO DEST FACET C/T W/IG SNGL Left 2015    Procedure: LEFT T11-12 FACET JOINT NERVE ABLATIONFLUORO, PRONE, RF PROTOCOL;  Surgeon: Gino Gan D.O.;  Location: [facility];  Service: Pain Management    NEURO DEST FACET C/T W/IG ADDL  2015    Procedure: NEURO DEST FACET C/T W/IG ADDL;  Surgeon: Gino Gan D.O.;  Location: SURGERY [facility];  Service: Pain Management    NEURO DEST FACET L/S W/IG SNGL  2015    Procedure: NEURO DEST FACET L/S W/IG SNGL;  Surgeon: Gino Gan D.O.;  Location: SURGERY [facility];  Service: Pain Management       Family History   Problem Relation Age of Onset    Sleep Apnea Maternal Uncle        Social History     Socioeconomic History    Marital status: Single     Spouse name: Not on file    Number of children: Not on file    Years of education: Not on file    Highest education level: Not on file   Occupational History    Not on file   Tobacco Use    Smoking status: Former     Current packs/day: 0.00     Average packs/day: 1 pack/day for 14.0 years (14.0 ttl pk-yrs)     Types: Cigarettes     Start date: 1994     Quit date: 2008     Years since quittin.1      Passive exposure: Past    Smokeless tobacco: Never   Vaping Use    Vaping Use: Every day    Substances: THC, CBD    Devices: Disposable, Pre-filled or refillable cartridge    Passive vaping exposure: Yes   Substance and Sexual Activity    Alcohol use: Not Currently     Alcohol/week: 1.8 oz     Types: 3 Standard drinks or equivalent per week    Drug use: Yes     Types: Marijuana, Inhaled     Comment: vape pen rather than smoking the medical marijuana    Sexual activity: Not on file   Other Topics Concern    Not on file   Social History Narrative    Not on file     Social Determinants of Health     Financial Resource Strain: Not on file   Food Insecurity: Not on file   Transportation Needs: Not on file   Physical Activity: Not on file   Stress: Not on file   Social Connections: Not on file   Intimate Partner Violence: Not on file   Housing Stability: Not on file       Current Outpatient Medications   Medication Sig Dispense Refill    [START ON 3/1/2024] amphetamine-dextroamphetamine (ADDERALL) 10 MG Tab Take 1 Tablet by mouth 3 times a day for 30 days. Indications: Recurring Sleep Episodes During the Day 90 Tablet 0    [START ON 3/1/2024] Armodafinil (NUVIGIL) 250 MG Tab Take 1 Tablet by mouth every morning for 30 days. Indications: Recurring Sleep Episodes During the Day 30 Tablet 0    ketoconazole (NIZORAL) 2 % Cream AAA, face, 1-2 times per day until resolved, then use 2-3 times per week for maintenance 30 g 1    clobetasol (TEMOVATE) 0.05 % external solution Use twice day for 1-2 weeks until resolved, then 2-3 times per week as needed 50 mL 3    Lactated Ringers (LR) Solution       meloxicam (MOBIC) 15 MG tablet TAKE 1 TABLET BY MOUTH ONCE DAILY AS NEEDED FOR PAIN      ondansetron (ZOFRAN) 4 MG Tab tablet TAKE 1 TABLET BY MOUTH EVERY 8 HOURS AS NEEDED FOR NAUSEA      testosterone cypionate (DEPO-TESTOSTERONE) 200 MG/ML injection       fluticasone (FLOVENT HFA) 110 MCG/ACT Aerosol Inhale 2 Puffs 2 times a day.  "Use spacer. Rinse mouth after each use. 3 Each 3    albuterol (PROVENTIL) 2.5mg/3ml Nebu Soln solution for nebulization Take 3 mL by nebulization every four hours as needed for Shortness of Breath. 120 mL 11    albuterol (VENTOLIN HFA) 108 (90 Base) MCG/ACT Aero Soln inhalation aerosol Inhale 2 Puffs every four hours as needed for Shortness of Breath. 1 Each 11    oxyCODONE-acetaminophen (PERCOCET) 7.5-325 MG per tablet TAKE 1 ORAL TABLET FOUR TIMES A DAY. DNF UNTIL 01/18/2022- #120 FOR 30 DAYS      ondansetron (ZOFRAN ODT) 4 MG TABLET DISPERSIBLE Take 4 mg by mouth every 8 hours as needed for Nausea/Vomiting.      NON SPECIFIED Oxygen 2L N/C at noc      pantoprazole (PROTONIX) 40 MG Tablet Delayed Response Take 40 mg by mouth every day.      lidocaine (XYLOCAINE) 5 % Ointment Apply  to affected area(s) as needed.      testosterone (TESTIM,ANDROGEL) 50 MG/5GM (1%) GEL gel Apply 5 g to skin as directed 2 Times a Day.      levothyroxine (SYNTHROID) 100 MCG TABS Take 100 mcg by mouth Every morning on an empty stomach.      methocarbamol (ROBAXIN) 750 MG TABS Take 750 mg by mouth 3 times a day.      gabapentin (NEURONTIN) 300 MG CAPS Take 300 mg by mouth 3 times a day.      vitamin D, Ergocalciferol, (DRISDOL) 01287 UNITS CAPS capsule Take  by mouth every 7 days.       No current facility-administered medications for this visit.        ALLERGIES: Patient has no known allergies.    ROS  Constitutional: Denies fevers, Denies weight changes  Ears/Nose/Throat/Mouth: Denies nasal congestion or sore throat   Cardiovascular: Denies chest pain  Respiratory: Denies shortness of breath, Denies cough  Gastrointestinal/Hepatic: Denies nausea, vomiting  Sleep: see HPI      PHYSICAL EXAM  /72 (BP Location: Left arm, Patient Position: Sitting, BP Cuff Size: Adult)   Pulse 93   Resp 16   Ht 1.727 m (5' 8\")   Wt 78.1 kg (172 lb 3.2 oz)   SpO2 96%   BMI 26.18 kg/m²   Appearance: Well-nourished, well-developed, no acute " distress  Eyes:  No scleral icterus , EOMI  Musculoskeletal:  Grossly normal; gait and station normal; digits and nails normal  Skin:  No rashes, petechiae, cyanosis  Neurologic: without focal signs; oriented to person, time, place, and purpose; judgement intact      Medical Decision Making   Assessment and Plan  Donato Valle is a 38 y.o.male  with chronic pain, CP, GERD, chronic respiratory failure with supplemental oxygen 2 L/min, Narcolepsy type II, and obstructive sleep apnea on CPAP.  Presents today to follow-up regarding management of narcolepsy type II and obstructive sleep apnea.    The medical record was reviewed.    Obstructive sleep apnea  Diagnostic and titration nocturnal polysomnogram's, home sleep apnea tests, continuous nocturnal oximetry results, multiple sleep latency tests, and compliance reports reviewed.  Compliance data reviewed showing 87% usage > 4hours in last 30  days. Average AHI 4.2 events/hour. Pt continues to use and benefit from machine.      Current Settings auto CPAP 4-8    Due to patient's concerns regarding mask fitting leak at night.  As well as comfort.  Order placed for mask fitting and supplies.  Advised to contact his DME about mask getting    PLAN:   -Order placed for mask and supplies   -Order placed for mask fitting nasal mask  -Advised to reach out via MyChart with questions     Has been advised to continue the current CPAP, clean equipment frequently, and get new mask and supplies as allowed by insurance and DME. Recommend an earlier appointment, if significant treatment barriers develop.    Patients with JENNIFER are at increased risk of cardiovascular disease including coronary artery disease, systemic arterial hypertension, pulmonary arterial hypertension, cardiac arrythmias, and stroke. The patient was advised to avoid driving a motor vehicle when drowsy.    Positive airway pressure will favorably impact many of the adverse conditions and effects provoked by  JENNIFER.    Narcolepsy type II  Patient continues to benefit from medication.  He is currently taking Nuvigil 250 mg once daily and Adderall 10 mg 3 times a day.  On this regimen he finds it helpful to his sleep.  He is able to function well during the day.  He denies any drowsiness while driving.  He does report a couple days out of the week he may have days where he is sleepy.  Even on medication.  He has not correlated this with anything recently.  May be due to mask leak or not wearing the CPAP for the full night depending on the day.  Advised to try to keep a log of when these days occur and if there is any correlation to PAP usage or any other correlation noted.     Plan  -Continue armodafinil 250 mg daily  -Continue Adderall 10 mg 3 times a day as needed for sleepiness  -Both armodafinil and Adderall refilled at today's visit  -Advised to avoid driving if drowsy  -Recommended starting a log of when sleepy during the day and if there is any correlation to previous night sleep      Have advised the patient to follow up with the appropriate healthcare practitioners for all other medical problems and issues.    Return in about 3 months (around 5/27/2024).      Please note portions of this record was created using voice recognition software. I have made every reasonable attempt to correct obvious errors, but I expect that there are errors of grammar and possibly content I did not discover before finalizing the note.

## 2024-02-27 NOTE — TELEPHONE ENCOUNTER
Have we ever prescribed this med? Yes.  If yes, what date? 2.2.24    Last OV: 2.27.24    Next OV: 5.30.24    DX: JENNIFER    Medications:   amphetamine-dextroamphetamine (ADDERALL) 10 MG Tab

## 2024-02-27 NOTE — TELEPHONE ENCOUNTER
Have we ever prescribed this med? Yes.  If yes, what date? 2.2.24    Last OV: 2.27.24    Next OV: 5.30.24    DX: JENNIFER     Medications: Armodafinil (NUVIGIL) 250 MG Tab

## 2024-02-28 RX ORDER — ARMODAFINIL 250 MG/1
1 TABLET ORAL EVERY MORNING
Qty: 30 TABLET | Refills: 0 | Status: SHIPPED | OUTPATIENT
Start: 2024-03-01 | End: 2024-03-31

## 2024-02-28 RX ORDER — DEXTROAMPHETAMINE SACCHARATE, AMPHETAMINE ASPARTATE, DEXTROAMPHETAMINE SULFATE AND AMPHETAMINE SULFATE 2.5; 2.5; 2.5; 2.5 MG/1; MG/1; MG/1; MG/1
10 TABLET ORAL 3 TIMES DAILY
Qty: 90 TABLET | Refills: 0 | Status: SHIPPED | OUTPATIENT
Start: 2024-03-01 | End: 2024-03-31

## 2024-03-04 ENCOUNTER — APPOINTMENT (OUTPATIENT)
Dept: DERMATOLOGY | Facility: IMAGING CENTER | Age: 39
End: 2024-03-04
Payer: MEDICARE

## 2024-04-10 DIAGNOSIS — G47.419 NARCOLEPSY WITHOUT CATAPLEXY: ICD-10-CM

## 2024-04-10 DIAGNOSIS — G47.19 EXCESSIVE DAYTIME SLEEPINESS: ICD-10-CM

## 2024-04-10 RX ORDER — DEXTROAMPHETAMINE SACCHARATE, AMPHETAMINE ASPARTATE, DEXTROAMPHETAMINE SULFATE AND AMPHETAMINE SULFATE 2.5; 2.5; 2.5; 2.5 MG/1; MG/1; MG/1; MG/1
10 TABLET ORAL 3 TIMES DAILY
Qty: 90 TABLET | Refills: 0 | Status: SHIPPED | OUTPATIENT
Start: 2024-04-10 | End: 2024-05-10

## 2024-04-10 RX ORDER — ARMODAFINIL 250 MG/1
1 TABLET ORAL EVERY MORNING
Qty: 30 TABLET | Refills: 0 | Status: SHIPPED | OUTPATIENT
Start: 2024-04-10 | End: 2024-05-10

## 2024-04-10 NOTE — TELEPHONE ENCOUNTER
Have we ever prescribed this med? Yes.  If yes, what date? 03/01/24    Last OV: 02/27/24 with Dr Bhakta    Next OV: 05/30/24 with Dr Bhakta    DX: Narcolepsy without cataplexy [G47.419]; Excessive daytime sleepiness [G47.19]     Medications:   Requested Prescriptions     Pending Prescriptions Disp Refills    amphetamine-dextroamphetamine (ADDERALL) 10 MG Tab 90 Tablet 0     Sig: Take 1 Tablet by mouth 3 times a day for 30 days. Indications: Recurring Sleep Episodes During the Day    Armodafinil (NUVIGIL) 250 MG Tab 30 Tablet 0     Sig: Take 1 Tablet by mouth every morning for 30 days. Indications: Recurring Sleep Episodes During the Day

## 2024-05-07 ENCOUNTER — NON-PROVIDER VISIT (OUTPATIENT)
Dept: SLEEP MEDICINE | Facility: MEDICAL CENTER | Age: 39
End: 2024-05-07
Attending: INTERNAL MEDICINE
Payer: MEDICARE

## 2024-05-07 ENCOUNTER — TELEPHONE (OUTPATIENT)
Dept: SLEEP MEDICINE | Facility: MEDICAL CENTER | Age: 39
End: 2024-05-07

## 2024-05-07 VITALS — HEIGHT: 68 IN | BODY MASS INDEX: 25.46 KG/M2 | WEIGHT: 168 LBS

## 2024-05-07 DIAGNOSIS — G47.33 OSA (OBSTRUCTIVE SLEEP APNEA): ICD-10-CM

## 2024-05-07 DIAGNOSIS — J45.40 MODERATE PERSISTENT ASTHMA WITHOUT COMPLICATION: ICD-10-CM

## 2024-05-07 DIAGNOSIS — G47.19 EXCESSIVE DAYTIME SLEEPINESS: ICD-10-CM

## 2024-05-07 DIAGNOSIS — J96.11 CHRONIC RESPIRATORY FAILURE WITH HYPOXIA (HCC): ICD-10-CM

## 2024-05-07 DIAGNOSIS — G47.419 NARCOLEPSY WITHOUT CATAPLEXY: ICD-10-CM

## 2024-05-07 PROCEDURE — 94729 DIFFUSING CAPACITY: CPT | Mod: 26 | Performed by: STUDENT IN AN ORGANIZED HEALTH CARE EDUCATION/TRAINING PROGRAM

## 2024-05-07 PROCEDURE — 94060 EVALUATION OF WHEEZING: CPT | Mod: 26 | Performed by: STUDENT IN AN ORGANIZED HEALTH CARE EDUCATION/TRAINING PROGRAM

## 2024-05-07 PROCEDURE — 94726 PLETHYSMOGRAPHY LUNG VOLUMES: CPT | Mod: 26 | Performed by: STUDENT IN AN ORGANIZED HEALTH CARE EDUCATION/TRAINING PROGRAM

## 2024-05-07 ASSESSMENT — PULMONARY FUNCTION TESTS
FVC_PREDICTED: 4.92
FEV1/FVC: 74
FEV1/FVC: 77.97
FVC_LLN: 4.11
FEV1/FVC_PERCENT_PREDICTED: 90
FEV1_LLN: 3.34
FEV1_PERCENT_PREDICTED: 91
FEV1: 3.68
FEV1_PERCENT_CHANGE: 0
FEV1_PREDICTED: 4
FEV1_PERCENT_PREDICTED: 85
FEV1/FVC: 74
FEV1: 3.44
FVC_PERCENT_PREDICTED: 94
FEV1/FVC_PERCENT_PREDICTED: 90
FVC: 4.72
FVC_PERCENT_PREDICTED: 95
FEV1/FVC_PERCENT_PREDICTED: 95
FVC: 4.67
FEV1/FVC_PERCENT_LLN: 68
FEV1/FVC_PREDICTED: 81
FEV1/FVC_PERCENT_PREDICTED: 96
FEV1/FVC: 78
FEV1/FVC_PERCENT_PREDICTED: 81
FEV1_PERCENT_CHANGE: 6
FEV1/FVC_PERCENT_CHANGE: 5

## 2024-05-07 ASSESSMENT — FIBROSIS 4 INDEX: FIB4 SCORE: 0.86

## 2024-05-07 NOTE — TELEPHONE ENCOUNTER
Patient came in stating [facility] in [place] is asking for a new order for a Refit for his mask. If you can fax over please. Contact him w/any questions.     Also he is requesting a refill on Adderall and Nuvigil to be sent to [facility] in [place]. Thank you

## 2024-05-07 NOTE — PROCEDURES
Tech: Ifeoma Santamaria, RT  Good patient effort & cooperation.  Test was performed on the [employer] Body Plethysmograph- Elite DX system.  The predicted sets used for Spirometry are GLI-2012, for Lung Volumes are ITS, and for DLCO is GLI 2017.  The results of this test meet the ATS standards for acceptability and repeatability.  The DLCO was uncorrected for Hb.  A bronchodilator of Ventolin HFA 2 puffs via spacer was administered.  DLCO was performed during dilation period.  Longer recovery times due to dizziness and pain in severely affected lung.    Interpretation:    There is no significant obstruction or restriction. No significant bronchodilator response. Normal diffusion capacity.

## 2024-05-08 RX ORDER — ARMODAFINIL 250 MG/1
1 TABLET ORAL EVERY MORNING
Qty: 30 TABLET | Refills: 0 | Status: SHIPPED | OUTPATIENT
Start: 2024-05-08 | End: 2024-06-07

## 2024-05-08 RX ORDER — DEXTROAMPHETAMINE SACCHARATE, AMPHETAMINE ASPARTATE, DEXTROAMPHETAMINE SULFATE AND AMPHETAMINE SULFATE 2.5; 2.5; 2.5; 2.5 MG/1; MG/1; MG/1; MG/1
10 TABLET ORAL 3 TIMES DAILY
Qty: 90 TABLET | Refills: 0 | Status: SHIPPED | OUTPATIENT
Start: 2024-05-08 | End: 2024-06-07

## 2024-05-08 NOTE — TELEPHONE ENCOUNTER
Pt needs order for mask fit    Have we ever prescribed this med? Yes.  If yes, what date? 4.10.24    Last OV: 2.27.24    Next OV: 5.30.24    DX: JENNIFER    Medications: Adderall        Have we ever prescribed this med? Yes.  If yes, what date? 4.10.24    Last OV: 2.27.24    Next OV: 5.30.24    DX: JENNIFER    Medications: Nuvigil

## 2024-05-19 DIAGNOSIS — L21.9 SEBORRHEIC DERMATITIS: ICD-10-CM

## 2024-05-20 RX ORDER — KETOCONAZOLE 20 MG/G
CREAM TOPICAL
Qty: 30 G | Refills: 1 | Status: SHIPPED | OUTPATIENT
Start: 2024-05-20

## 2024-05-20 RX ORDER — CLOBETASOL PROPIONATE 0.46 MG/ML
SOLUTION TOPICAL
Qty: 50 ML | Refills: 3 | Status: SHIPPED | OUTPATIENT
Start: 2024-05-20

## 2024-05-30 ENCOUNTER — OFFICE VISIT (OUTPATIENT)
Dept: SLEEP MEDICINE | Facility: MEDICAL CENTER | Age: 39
End: 2024-05-30
Attending: STUDENT IN AN ORGANIZED HEALTH CARE EDUCATION/TRAINING PROGRAM
Payer: MEDICARE

## 2024-05-30 VITALS
BODY MASS INDEX: 26.37 KG/M2 | HEIGHT: 68 IN | WEIGHT: 174 LBS | DIASTOLIC BLOOD PRESSURE: 82 MMHG | OXYGEN SATURATION: 100 % | SYSTOLIC BLOOD PRESSURE: 118 MMHG | HEART RATE: 84 BPM

## 2024-05-30 DIAGNOSIS — G47.419 NARCOLEPSY WITHOUT CATAPLEXY: ICD-10-CM

## 2024-05-30 DIAGNOSIS — G47.19 EXCESSIVE DAYTIME SLEEPINESS: ICD-10-CM

## 2024-05-30 DIAGNOSIS — G47.33 OSA (OBSTRUCTIVE SLEEP APNEA): ICD-10-CM

## 2024-05-30 ASSESSMENT — FIBROSIS 4 INDEX: FIB4 SCORE: 0.86

## 2024-05-30 NOTE — PROGRESS NOTES
[facility] Follow-up Visit    CC: Follow-up to discuss management of narcolepsy type II and obstructive sleep apnea      HPI:  Donato Valle is a 38 y.o.male  with cerebral palsy, GERD, chronic pain, chronic respiratory failure with supplemental oxygen 2 L/min, narcolepsy type II, and obstructive sleep apnea on CPAP.  Presents today to follow-up regarding management of narcolepsy type II and obstructive sleep apnea.    He continues to use his CPAP machine regularly.  He is using supplemental oxygen as well 24 hours a day 2 L/min.  He finds his CPAP pressure comfortable.  He is interested in trying a different mask.  He has set up an appointment with his DME company to do a mask fitting next week.  He is interested in trying a nasal cushion or nasal pillow.    He states he is having trouble with daytime tiredness.  He attributes this to lack of sleep.  He states his sleep schedule can vary based on what he is doing at night.  He will sometimes stay up later to complete tasks which will lead to getting less sleep the next day.  He generally keeps the same wake time.  However his bedtime can vary.  He understands that going to bed at the same time every night can impact his ability to function the next day.  With getting a regular night sleep he does have better functioning and more productivity the following day.  He is having an internal struggle with completing tasks and staying up at night versus getting to sleep at a regular time.    He did get [facility].  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    DME provider: Gunnar Rutledge  Device: airsense 11  Mask: fullface   Aerophagia: No   Snoring: No   Dry mouth: Yes  Leak: No   Skin irritation: No   Chin strap:  No      Sleep History  Previously diagnosed outside facility Dr. Townsend's office around 2015 2016 which showed narcolepsy without cataplexy.  3/22/2022 PSG showed mild obstructive sleep apnea overall AHI of 5.6, minimum oxygen saturation 86%, time at or below 88% saturation of 18.7 minutes.  Sleep onset REM period was seen during PSG  3/23/2022 MSLT showed mean sleep latency of 2.2 minutes and 3 sleep onset REM periods were identified consistent with a diagnosis of narcolepsy.    Patient Active Problem List    Diagnosis Date Noted    Chronic pericarditis 02/15/2022    Gastroesophageal reflux 02/15/2022    Spastic cerebral palsy (HCC) 02/15/2022    Chronic pain 10/07/2015    Lumbosacral spondylosis without myelopathy 05/08/2015       Past Medical History:   Diagnosis Date    ASTHMA     Back pain     Cerebral palsy (HCC)     Chickenpox     Daytime sleepiness     GERD (gastroesophageal reflux disease)     Hypothyroidism     Narcolepsy     Pain     cerebral palsy,    Rash     Rhinitis     Shortness of breath     Sleep apnea     Unspecified disorder of thyroid     Wheezing         Past Surgical History:   Procedure Laterality Date    PUMP INSERT/REMOVE Left 10/7/2015    Procedure: PUMP INSERT--Baclofen;  Surgeon: iYfan Sinclair;  Location: [facility];  Service:     NEURO DEST FACET C/T W/IG SNGL Left 5/8/2015    Procedure: LEFT T11-12 FACET JOINT NERVE ABLATIONFLUORO, PRONE, RF PROTOCOL;  Surgeon: Gino Gan D.O.;  Location: [facility];  Service: Pain Management    NEURO DEST FACET C/T W/IG ADDL  5/8/2015    Procedure: NEURO DEST FACET C/T W/IG ADDL;  Surgeon: Gino Gan D.O.;  Location: [facility];  Service: Pain Management    NEURO DEST FACET L/S W/IG SNGL  5/8/2015    Procedure: NEURO DEST FACET L/S W/IG SNGL;  Surgeon: Gino Gan D.O.;  Location: [facility];  Service: Pain Management       Family History   Problem Relation Age of Onset     Sleep Apnea Maternal Uncle        Social History     Socioeconomic History    Marital status: Single     Spouse name: Not on file    Number of children: Not on file    Years of education: Not on file    Highest education level: Not on file   Occupational History    Not on file   Tobacco Use    Smoking status: Former     Current packs/day: 0.00     Average packs/day: 1 pack/day for 14.0 years (14.0 ttl pk-yrs)     Types: Cigarettes     Start date: 1994     Quit date: 2008     Years since quittin.4     Passive exposure: Past    Smokeless tobacco: Never   Vaping Use    Vaping status: Every Day    Substances: THC, CBD    Devices: Disposable, Pre-filled or refillable cartridge    Passive vaping exposure: Yes   Substance and Sexual Activity    Alcohol use: Not Currently     Alcohol/week: 1.8 oz     Types: 3 Standard drinks or equivalent per week    Drug use: Yes     Types: Marijuana, Inhaled     Comment: vape pen rather than smoking the medical marijuana    Sexual activity: Not on file   Other Topics Concern    Not on file   Social History Narrative    Not on file     Social Determinants of Health     Financial Resource Strain: Not on file   Food Insecurity: Not on file   Transportation Needs: Not on file   Physical Activity: Not on file   Stress: Not on file   Social Connections: Not on file   Intimate Partner Violence: Low Risk  (2023)    Received from [facility]    History of Abuse     History of Abuse: Denies   Housing Stability: Not on file       Current Outpatient Medications   Medication Sig Dispense Refill    ketoconazole (NIZORAL) 2 % Cream AAA, face, 1-2 times per day until resolved, then use 2-3 times per week for maintenance 30 g 1    clobetasol (TEMOVATE) 0.05 % external solution Use twice day for 1-2 weeks until resolved, then 2-3 times per week as needed 50 mL 3    Armodafinil (NUVIGIL) 250 MG Tab Take 1 Tablet by mouth every morning for 30 days. Indications: Recurring Sleep  Episodes During the Day 30 Tablet 0    amphetamine-dextroamphetamine (ADDERALL) 10 MG Tab Take 1 Tablet by mouth 3 times a day for 30 days. Indications: Recurring Sleep Episodes During the Day 90 Tablet 0    anastrozole (ARIMIDEX) 1 MG Tab       baclofen (LIORESAL) 10 MG Tab TAKE 1 TABLET EVERY 8 HOURS AS NEEDED FOR WITHDRAWALS (TO BE USED IF BACLOFEN PAIN PUMP FAILURE)      Lactated Ringers (LR) Solution       meloxicam (MOBIC) 15 MG tablet TAKE 1 TABLET BY MOUTH ONCE DAILY AS NEEDED FOR PAIN      ondansetron (ZOFRAN) 4 MG Tab tablet TAKE 1 TABLET BY MOUTH EVERY 8 HOURS AS NEEDED FOR NAUSEA      testosterone cypionate (DEPO-TESTOSTERONE) 200 MG/ML injection       fluticasone (FLOVENT HFA) 110 MCG/ACT Aerosol Inhale 2 Puffs 2 times a day. Use spacer. Rinse mouth after each use. 3 Each 3    albuterol (PROVENTIL) 2.5mg/3ml Nebu Soln solution for nebulization Take 3 mL by nebulization every four hours as needed for Shortness of Breath. 120 mL 11    albuterol (VENTOLIN HFA) 108 (90 Base) MCG/ACT Aero Soln inhalation aerosol Inhale 2 Puffs every four hours as needed for Shortness of Breath. 1 Each 11    oxyCODONE-acetaminophen (PERCOCET) 7.5-325 MG per tablet TAKE 1 ORAL TABLET FOUR TIMES A DAY. DNF UNTIL 01/18/2022- #120 FOR 30 DAYS      ondansetron (ZOFRAN ODT) 4 MG TABLET DISPERSIBLE Take 4 mg by mouth every 8 hours as needed for Nausea/Vomiting.      NON SPECIFIED Oxygen 2L N/C at noc      pantoprazole (PROTONIX) 40 MG Tablet Delayed Response Take 40 mg by mouth every day.      lidocaine (XYLOCAINE) 5 % Ointment Apply  to affected area(s) as needed.      testosterone (TESTIM,ANDROGEL) 50 MG/5GM (1%) GEL gel Apply 5 g to skin as directed 2 Times a Day.      levothyroxine (SYNTHROID) 100 MCG TABS Take 100 mcg by mouth Every morning on an empty stomach.      methocarbamol (ROBAXIN) 750 MG TABS Take 750 mg by mouth 3 times a day.      gabapentin (NEURONTIN) 300 MG CAPS Take 300 mg by mouth 3 times a day.      vitamin D,  "Ergocalciferol, (DRISDOL) 65776 UNITS CAPS capsule Take  by mouth every 7 days.       No current facility-administered medications for this visit.        ALLERGIES: Patient has no known allergies.    ROS  Constitutional: Denies fevers, Denies weight changes  Ears/Nose/Throat/Mouth: Denies nasal congestion or sore throat   Cardiovascular: Denies chest pain  Respiratory: Denies shortness of breath, Denies cough  Gastrointestinal/Hepatic: Denies nausea, vomiting  Sleep: see HPI      PHYSICAL EXAM  /82 (BP Location: Left arm, Patient Position: Sitting, BP Cuff Size: Adult)   Pulse 84   Ht 1.727 m (5' 8\")   Wt 78.9 kg (174 lb)   SpO2 100% Comment: 2L  BMI 26.46 kg/m²   Appearance: Well-nourished, well-developed, no acute distress  Eyes:  No scleral icterus , EOMI  Musculoskeletal:  Grossly normal; digits and nails normal  Skin:  No rashes, petechiae, cyanosis  Neurologic: without focal signs; oriented to person, time, place, and purpose; judgement intact      Medical Decision Making   Assessment and Plan  Donato Londonell Leonard is a 38 y.o.male  with cerebral palsy, GERD, chronic pain, chronic respiratory failure with supplemental oxygen 2 L/min, narcolepsy type II, and obstructive sleep apnea on CPAP.  Presents today to follow-up regarding management of narcolepsy type II and obstructive sleep apnea.    The medical record was reviewed.    Obstructive sleep apnea  Compliance data reviewed showing 73% usage > 4hours in last 30  days. Average AHI 3 events/hour. Pt continues to use and benefit from machine.      Current Settings Auto CPAP 4-8    PLAN:   -Order placed for mask and supplies   -Advised to reach out via MyChart with questions     Has been advised to continue the current CPAP, clean equipment frequently, and get new mask and supplies as allowed by insurance and DME. Recommend an earlier appointment, if significant treatment barriers develop.    Patients with JENNIFER are at increased risk of cardiovascular " disease including coronary artery disease, systemic arterial hypertension, pulmonary arterial hypertension, cardiac arrythmias, and stroke. The patient was advised to avoid driving a motor vehicle when drowsy.    Narcolepsy type II  Patient is benefiting from current medication regimen of Nuvigil 250 mg and Adderall 10 mg 3 times a day as needed for sleepiness.  Reviewed importance of maintaining a regular sleep schedule all week long.  Advised to keep the same wake time and to try to keep the same bedtime.  Discussed this at length with patient.  Patient was understanding.  He identified the problem and knows that it is a behavior he needs to change.  Denies any side effects from medications.      Plan  -Continue Nuvigil 250 mg daily as needed for sleepiness and Adderall 10 mg 3 times a day as needed for sleepiness  -Maintain regular sleep schedule with same bedtime and wake time daily  -Advised may nap during the day if needed    Return in about 4 months (around 9/30/2024).    Total time care for the patient this date was 35 minutes. Time spent includes chart review, lab review, discussion with myself. This time was spent separate from device analysis /programming on this date.      Please note portions of this record was created using voice recognition software. I have made every reasonable attempt to correct obvious errors, but I expect that there are errors of grammar and possibly content I did not discover before finalizing the note.

## 2024-06-09 DIAGNOSIS — G47.419 NARCOLEPSY WITHOUT CATAPLEXY: ICD-10-CM

## 2024-06-09 DIAGNOSIS — G47.19 EXCESSIVE DAYTIME SLEEPINESS: ICD-10-CM

## 2024-06-10 RX ORDER — DEXTROAMPHETAMINE SACCHARATE, AMPHETAMINE ASPARTATE, DEXTROAMPHETAMINE SULFATE AND AMPHETAMINE SULFATE 2.5; 2.5; 2.5; 2.5 MG/1; MG/1; MG/1; MG/1
10 TABLET ORAL 3 TIMES DAILY
Qty: 90 TABLET | Refills: 0 | Status: SHIPPED | OUTPATIENT
Start: 2024-06-10 | End: 2024-07-10

## 2024-06-10 RX ORDER — ARMODAFINIL 250 MG/1
1 TABLET ORAL EVERY MORNING
Qty: 30 TABLET | Refills: 0 | Status: SHIPPED | OUTPATIENT
Start: 2024-06-10 | End: 2024-07-10

## 2024-06-10 NOTE — TELEPHONE ENCOUNTER
Have we ever prescribed this med? Yes.  If yes, what date? 5.8.24    Last OV: 5.30.24    Next OV: 9.17.24    DX: Narcolepsy without cataplexy    Medications: amphetamine-dextroamphetamine (ADDERALL) 10 MG Tab

## 2024-06-12 ENCOUNTER — PATIENT MESSAGE (OUTPATIENT)
Dept: DERMATOLOGY | Facility: IMAGING CENTER | Age: 39
End: 2024-06-12
Payer: MEDICARE

## 2024-06-12 DIAGNOSIS — L21.9 SEBORRHEIC DERMATITIS: ICD-10-CM

## 2024-06-12 RX ORDER — KETOCONAZOLE 20 MG/G
CREAM TOPICAL
Qty: 30 G | Refills: 1 | Status: SHIPPED | OUTPATIENT
Start: 2024-06-12

## 2024-07-08 DIAGNOSIS — G47.419 NARCOLEPSY WITHOUT CATAPLEXY: ICD-10-CM

## 2024-07-08 DIAGNOSIS — G47.19 EXCESSIVE DAYTIME SLEEPINESS: ICD-10-CM

## 2024-07-09 RX ORDER — ARMODAFINIL 250 MG/1
1 TABLET ORAL EVERY MORNING
Qty: 30 TABLET | Refills: 0 | Status: SHIPPED | OUTPATIENT
Start: 2024-07-09 | End: 2024-08-08

## 2024-07-09 RX ORDER — DEXTROAMPHETAMINE SACCHARATE, AMPHETAMINE ASPARTATE, DEXTROAMPHETAMINE SULFATE AND AMPHETAMINE SULFATE 2.5; 2.5; 2.5; 2.5 MG/1; MG/1; MG/1; MG/1
10 TABLET ORAL 3 TIMES DAILY
Qty: 90 TABLET | Refills: 0 | Status: SHIPPED | OUTPATIENT
Start: 2024-07-09 | End: 2024-08-08

## 2024-07-22 ENCOUNTER — PATIENT MESSAGE (OUTPATIENT)
Dept: SLEEP MEDICINE | Facility: MEDICAL CENTER | Age: 39
End: 2024-07-22
Payer: MEDICARE

## 2024-08-10 DIAGNOSIS — G47.419 NARCOLEPSY WITHOUT CATAPLEXY: ICD-10-CM

## 2024-08-10 DIAGNOSIS — G47.19 EXCESSIVE DAYTIME SLEEPINESS: ICD-10-CM

## 2024-08-12 RX ORDER — DEXTROAMPHETAMINE SACCHARATE, AMPHETAMINE ASPARTATE, DEXTROAMPHETAMINE SULFATE AND AMPHETAMINE SULFATE 2.5; 2.5; 2.5; 2.5 MG/1; MG/1; MG/1; MG/1
10 TABLET ORAL 3 TIMES DAILY
Qty: 90 TABLET | Refills: 0 | Status: SHIPPED | OUTPATIENT
Start: 2024-08-12 | End: 2024-09-11

## 2024-08-12 RX ORDER — ARMODAFINIL 250 MG/1
1 TABLET ORAL EVERY MORNING
Qty: 30 TABLET | Refills: 0 | Status: SHIPPED | OUTPATIENT
Start: 2024-08-12 | End: 2024-09-11

## 2024-08-12 NOTE — TELEPHONE ENCOUNTER
Have we ever prescribed this med? Yes.  If yes, what date? 7.9.24    Last OV: 5.30.24    Next OV: 9.17.24    DX: Narcolepsy without cataplexy       Medications:        Armodafinil (NUVIGIL) 250 MG Tab    Have we ever prescribed this med? Yes.  If yes, what date? 7.9.24    Last OV: 5.30.24    Next OV: 9.17.24    DX: Narcolepsy without cataplexy +2 more        Medications:     amphetamine-dextroamphetamine (ADDERALL) 10 MG Tab

## 2024-09-04 ENCOUNTER — TELEPHONE (OUTPATIENT)
Dept: SLEEP MEDICINE | Facility: MEDICAL CENTER | Age: 39
End: 2024-09-04
Payer: MEDICARE

## 2024-09-04 NOTE — TELEPHONE ENCOUNTER
09-04-24  1st NO SHOW  Date of No Show: 09/03/24  Provider: Dr. Jones  Reason For Visit: FV/PFT Results  Outcome of call:  no answer LVM.  Left call back for scheduling [phone].

## 2024-09-09 DIAGNOSIS — G47.19 EXCESSIVE DAYTIME SLEEPINESS: ICD-10-CM

## 2024-09-09 DIAGNOSIS — G47.419 NARCOLEPSY WITHOUT CATAPLEXY: ICD-10-CM

## 2024-09-09 RX ORDER — DEXTROAMPHETAMINE SACCHARATE, AMPHETAMINE ASPARTATE, DEXTROAMPHETAMINE SULFATE AND AMPHETAMINE SULFATE 2.5; 2.5; 2.5; 2.5 MG/1; MG/1; MG/1; MG/1
10 TABLET ORAL 3 TIMES DAILY
Qty: 90 TABLET | Refills: 0 | Status: SHIPPED | OUTPATIENT
Start: 2024-09-09 | End: 2024-10-09

## 2024-09-09 RX ORDER — ARMODAFINIL 250 MG/1
1 TABLET ORAL EVERY MORNING
Qty: 30 TABLET | Refills: 0 | Status: SHIPPED | OUTPATIENT
Start: 2024-09-09 | End: 2024-10-09

## 2024-09-09 NOTE — TELEPHONE ENCOUNTER
Have we ever prescribed this med? Yes.  If yes, what date? 8/12/2024    Last OV: 5/30/2024- Dr. Bhakta     Next OV: 9/17/2024- Dr. Bhakta    DX: Narcolepsy without cataplexy [G47.419]     Medications: Armodafinil (NUVIGIL) 250 MG Tab     Have we ever prescribed this med? Yes.  If yes, what date? 8/12/20224    Last OV: 5/30/2024- Dr. Bhakta     Next OV: 9/17/2024- Dr. Bhakta    DX: Narcolepsy without cataplexy [G47.419]; Excessive daytime sleepiness [G47.19]     Medications: amphetamine-dextroamphetamine (ADDERALL) 10 MG Tab

## 2024-09-17 ENCOUNTER — OFFICE VISIT (OUTPATIENT)
Dept: SLEEP MEDICINE | Facility: MEDICAL CENTER | Age: 39
End: 2024-09-17
Attending: STUDENT IN AN ORGANIZED HEALTH CARE EDUCATION/TRAINING PROGRAM
Payer: MEDICARE

## 2024-09-17 VITALS
HEART RATE: 85 BPM | DIASTOLIC BLOOD PRESSURE: 78 MMHG | SYSTOLIC BLOOD PRESSURE: 130 MMHG | HEIGHT: 68 IN | BODY MASS INDEX: 24.89 KG/M2 | WEIGHT: 164.2 LBS | RESPIRATION RATE: 16 BRPM | OXYGEN SATURATION: 99 %

## 2024-09-17 DIAGNOSIS — G47.33 OSA (OBSTRUCTIVE SLEEP APNEA): ICD-10-CM

## 2024-09-17 DIAGNOSIS — G47.419 NARCOLEPSY WITHOUT CATAPLEXY: ICD-10-CM

## 2024-09-17 PROCEDURE — 3075F SYST BP GE 130 - 139MM HG: CPT | Performed by: STUDENT IN AN ORGANIZED HEALTH CARE EDUCATION/TRAINING PROGRAM

## 2024-09-17 PROCEDURE — 3078F DIAST BP <80 MM HG: CPT | Performed by: STUDENT IN AN ORGANIZED HEALTH CARE EDUCATION/TRAINING PROGRAM

## 2024-09-17 PROCEDURE — 99212 OFFICE O/P EST SF 10 MIN: CPT | Performed by: STUDENT IN AN ORGANIZED HEALTH CARE EDUCATION/TRAINING PROGRAM

## 2024-09-17 PROCEDURE — 99214 OFFICE O/P EST MOD 30 MIN: CPT | Performed by: STUDENT IN AN ORGANIZED HEALTH CARE EDUCATION/TRAINING PROGRAM

## 2024-09-17 ASSESSMENT — FIBROSIS 4 INDEX: FIB4 SCORE: 0.86

## 2024-09-17 NOTE — PROGRESS NOTES
[facility] Follow-up Visit    CC: Follow-up regarding management of narcolepsy type II and obstructive sleep apnea      HPI:  Donato Valle is a 38 y.o.male  with GERD, chronic pain, cerebral palsy, chronic respiratory failure on supplemental oxygen 2 L/min, narcolepsy type II, and obstructive sleep apnea on CPAP.  Presents today to follow-up regarding management type II and obstructive sleep apnea.    He continues to use a CPAP machine nightly.  Overall finds that the pressure may be a little low at times.  He does use supplemental oxygen with his PAP machine.  He would like to increase the pressure slightly to see if this helps.    He continues to benefit from armodafinil 250 mg and Adderall 10 mg 3 times a day as needed for sleepiness.  He states he did get excepted to [facility] and is potentially working with Paty Robins on a future project.    He does report his sleep schedule continues to be somewhat varying throughout the week.  His wake schedule stays pretty consistent around 9 AM he would like to go to wake up a little bit sooner however his bedtime can vary.  He will sometimes get locked into a project where he stays up till 1 or 2 in the morning.  He does not have a wind down routine.      DME provider: Gunnar Rutledge  Device: airsense 11  Mask: fullface   Aerophagia: No   Snoring: No   Dry mouth: Yes  Leak: No   Skin irritation: No   Chin strap: No      Sleep History  Previously diagnosed outside facility Dr. Townsend's office around 2015 2016 which showed narcolepsy without cataplexy.  3/22/2022 PSG showed mild obstructive sleep apnea overall AHI of 5.6, minimum oxygen saturation 86%, time at or below 88% saturation of 18.7 minutes.  Sleep onset REM period was seen during PSG  3/23/2022 MSLT showed mean sleep latency of 2.2 minutes and 3 sleep onset REM periods were identified consistent with a diagnosis of narcolepsy.    Patient Active Problem List    Diagnosis Date Noted    Chronic  pericarditis 02/15/2022    Gastroesophageal reflux 02/15/2022    Spastic cerebral palsy (HCC) 02/15/2022    Chronic pain 10/07/2015    Lumbosacral spondylosis without myelopathy 2015       Past Medical History:   Diagnosis Date    ASTHMA     Back pain     Cerebral palsy (HCC)     Chickenpox     Daytime sleepiness     GERD (gastroesophageal reflux disease)     Hypothyroidism     Narcolepsy     Pain     cerebral palsy,    Rash     Rhinitis     Shortness of breath     Sleep apnea     Unspecified disorder of thyroid     Wheezing         Past Surgical History:   Procedure Laterality Date    PUMP INSERT/REMOVE Left 10/7/2015    Procedure: PUMP INSERT--Baclofen;  Surgeon: Yifan Sinclair;  Location: [facility];  Service:     NEURO DEST FACET C/T W/IG SNGL Left 2015    Procedure: LEFT T11-12 FACET JOINT NERVE ABLATIONFLUORO, PRONE, RF PROTOCOL;  Surgeon: Gino Gan D.O.;  Location: [facility];  Service: Pain Management    NEURO DEST FACET C/T W/IG ADDL  2015    Procedure: NEURO DEST FACET C/T W/IG ADDL;  Surgeon: Gino Gan D.O.;  Location: [facility];  Service: Pain Management    NEURO DEST FACET L/S W/IG SNGL  2015    Procedure: NEURO DEST FACET L/S W/IG SNGL;  Surgeon: Gino Gan D.O.;  Location: [facility];  Service: Pain Management       Family History   Problem Relation Age of Onset    Sleep Apnea Maternal Uncle        Social History     Socioeconomic History    Marital status: Single     Spouse name: Not on file    Number of children: Not on file    Years of education: Not on file    Highest education level: Not on file   Occupational History    Not on file   Tobacco Use    Smoking status: Former     Current packs/day: 0.00     Average packs/day: 1 pack/day for 14.0 years (14.0 ttl pk-yrs)     Types: Cigarettes     Start date: 1994     Quit date: 2008     Years since quittin.7     Passive exposure: Past     Smokeless tobacco: Never   Vaping Use    Vaping status: Every Day    Substances: THC, CBD    Devices: Disposable, Pre-filled or refillable cartridge    Passive vaping exposure: Yes   Substance and Sexual Activity    Alcohol use: Not Currently     Alcohol/week: 1.8 oz     Types: 3 Standard drinks or equivalent per week    Drug use: Yes     Types: Marijuana, Inhaled     Comment: vape pen rather than smoking the medical marijuana    Sexual activity: Not on file   Other Topics Concern    Not on file   Social History Narrative    Not on file     Social Determinants of Health     Financial Resource Strain: Not on file   Food Insecurity: Not on file   Transportation Needs: Not on file   Physical Activity: Not on file   Stress: Not on file   Social Connections: Not on file   Intimate Partner Violence: Low Risk  (2/1/2023)    Received from [facility]    History of Abuse     History of Abuse: Denies   Housing Stability: Not on file       Current Outpatient Medications   Medication Sig Dispense Refill    Armodafinil (NUVIGIL) 250 MG Tab Take 1 Tablet by mouth every morning for 30 days. Indications: Recurring Sleep Episodes During the Day 30 Tablet 0    amphetamine-dextroamphetamine (ADDERALL) 10 MG Tab Take 1 Tablet by mouth 3 times a day for 30 days. Indications: Recurring Sleep Episodes During the Day 90 Tablet 0    ketoconazole (NIZORAL) 2 % Cream AAA, approx. 0.5 to 1 gm, face, 1-2 times per day until resolved, then use 2-3 times per week for maintenance 30 g 1    clobetasol (TEMOVATE) 0.05 % external solution Use twice day for 1-2 weeks until resolved, then 2-3 times per week as needed 50 mL 3    anastrozole (ARIMIDEX) 1 MG Tab       baclofen (LIORESAL) 10 MG Tab TAKE 1 TABLET EVERY 8 HOURS AS NEEDED FOR WITHDRAWALS (TO BE USED IF BACLOFEN PAIN PUMP FAILURE)      Lactated Ringers (LR) Solution       meloxicam (MOBIC) 15 MG tablet TAKE 1 TABLET BY MOUTH ONCE DAILY AS NEEDED FOR PAIN      ondansetron (ZOFRAN)  4 MG Tab tablet TAKE 1 TABLET BY MOUTH EVERY 8 HOURS AS NEEDED FOR NAUSEA      testosterone cypionate (DEPO-TESTOSTERONE) 200 MG/ML injection       fluticasone (FLOVENT HFA) 110 MCG/ACT Aerosol Inhale 2 Puffs 2 times a day. Use spacer. Rinse mouth after each use. 3 Each 3    albuterol (PROVENTIL) 2.5mg/3ml Nebu Soln solution for nebulization Take 3 mL by nebulization every four hours as needed for Shortness of Breath. 120 mL 11    albuterol (VENTOLIN HFA) 108 (90 Base) MCG/ACT Aero Soln inhalation aerosol Inhale 2 Puffs every four hours as needed for Shortness of Breath. 1 Each 11    oxyCODONE-acetaminophen (PERCOCET) 7.5-325 MG per tablet TAKE 1 ORAL TABLET FOUR TIMES A DAY. DNF UNTIL 01/18/2022- #120 FOR 30 DAYS      ondansetron (ZOFRAN ODT) 4 MG TABLET DISPERSIBLE Take 4 mg by mouth every 8 hours as needed for Nausea/Vomiting.      NON SPECIFIED Oxygen 2L N/C at noc      pantoprazole (PROTONIX) 40 MG Tablet Delayed Response Take 40 mg by mouth every day.      lidocaine (XYLOCAINE) 5 % Ointment Apply  to affected area(s) as needed.      testosterone (TESTIM,ANDROGEL) 50 MG/5GM (1%) GEL gel Apply 5 g to skin as directed 2 Times a Day.      levothyroxine (SYNTHROID) 100 MCG TABS Take 100 mcg by mouth Every morning on an empty stomach.      methocarbamol (ROBAXIN) 750 MG TABS Take 750 mg by mouth 3 times a day.      gabapentin (NEURONTIN) 300 MG CAPS Take 300 mg by mouth 3 times a day.      vitamin D, Ergocalciferol, (DRISDOL) 01947 UNITS CAPS capsule Take  by mouth every 7 days.       No current facility-administered medications for this visit.        ALLERGIES: Patient has no known allergies.    ROS  Constitutional: Denies fevers, Denies weight changes  Ears/Nose/Throat/Mouth: Denies nasal congestion or sore throat   Cardiovascular: Denies chest pain  Respiratory: Denies shortness of breath, Denies cough  Gastrointestinal/Hepatic: Denies nausea, vomiting  Sleep: see HPI      PHYSICAL EXAM  /78 (BP Location:  "Left arm, Patient Position: Sitting, BP Cuff Size: Adult)   Pulse 85   Resp 16   Ht 1.727 m (5' 8\")   Wt 74.5 kg (164 lb 3.2 oz)   SpO2 99%   BMI 24.97 kg/m²   Appearance: Well-nourished, well-developed, no acute distress  Eyes:  No scleral icterus , EOMI  Musculoskeletal:  Grossly normal; gait and station normal; digits and nails normal  Skin:  No rashes, petechiae, cyanosis  Neurologic: without focal signs; oriented to person, time, place, and purpose; judgement intact      Medical Decision Making   Assessment and Plan  Donato Valle is a 38 y.o.male  with GERD, chronic pain, cerebral palsy, chronic respiratory failure on supplemental oxygen 2 L/min, narcolepsy type II, and obstructive sleep apnea on CPAP.  Presents today to follow-up regarding management type II and obstructive sleep apnea.    The medical record was reviewed.    Obstructive sleep apnea  Compliance data reviewed showing 60% usage > 4hours in last 30  days. Average AHI 4.6 events/hour. Pt continues to use and benefit from machine.      Current Settings auto CPAP 4-8    Given patient's concern increase pressure to minute pressure 5 maximum pressure 9    PLAN:   -Order placed for mask and supplies   -Advised to reach out via MyChart with questions     Has been advised to continue the current CPAP with supplemental oxygen, clean equipment frequently, and get new mask and supplies as allowed by insurance and DME. Recommend an earlier appointment, if significant treatment barriers develop.    Patients with JENNIFER are at increased risk of cardiovascular disease including coronary artery disease, systemic arterial hypertension, pulmonary arterial hypertension, cardiac arrythmias, and stroke. The patient was advised to avoid driving a motor vehicle when drowsy.    Positive airway pressure will favorably impact many of the adverse conditions and effects provoked by JENNIFER.    Narcolepsy type II  Patient is continue to use and benefit from armodafinil " 250 mg and Adderall 10 mg 3 times a day as needed for sleepiness.  Denies any side effects from medication.  Discussed importance of having a regulated sleep schedule.  Advised him to start with a alarm at night for a wind down period.  Discussed importance of consistency given his concern regarding schedule.  Advised that keeping the same bedtime and wake time would be important to allow for his medications to also have maximum benefit during the day.    Plan  -Continue armodafinil 250 mg and Adderall 10 mg 3 times a day  -Start nighttime alarm for wind down before bed approximately 1 hour before bedtime    Return in about 6 months (around 3/17/2025).      Please note portions of this record was created using voice recognition software. I have made every reasonable attempt to correct obvious errors, but I expect that there are errors of grammar and possibly content I did not discover before finalizing the note.

## 2024-09-19 ENCOUNTER — HOSPITAL ENCOUNTER (OUTPATIENT)
Dept: RADIOLOGY | Facility: MEDICAL CENTER | Age: 39
End: 2024-09-19
Attending: STUDENT IN AN ORGANIZED HEALTH CARE EDUCATION/TRAINING PROGRAM
Payer: MEDICARE

## 2024-09-19 ENCOUNTER — OFFICE VISIT (OUTPATIENT)
Dept: SLEEP MEDICINE | Facility: MEDICAL CENTER | Age: 39
End: 2024-09-19
Attending: STUDENT IN AN ORGANIZED HEALTH CARE EDUCATION/TRAINING PROGRAM
Payer: MEDICARE

## 2024-09-19 VITALS
DIASTOLIC BLOOD PRESSURE: 80 MMHG | BODY MASS INDEX: 24.86 KG/M2 | WEIGHT: 164 LBS | HEART RATE: 106 BPM | OXYGEN SATURATION: 94 % | SYSTOLIC BLOOD PRESSURE: 128 MMHG | HEIGHT: 68 IN

## 2024-09-19 DIAGNOSIS — J84.9 INTERSTITIAL PULMONARY DISEASE (HCC): ICD-10-CM

## 2024-09-19 DIAGNOSIS — J96.11 CHRONIC HYPOXEMIC RESPIRATORY FAILURE (HCC): ICD-10-CM

## 2024-09-19 DIAGNOSIS — Z97.8 PRESENCE OF INTRATHECAL BACLOFEN PUMP: ICD-10-CM

## 2024-09-19 DIAGNOSIS — J45.40 MODERATE PERSISTENT ASTHMA WITHOUT COMPLICATION: ICD-10-CM

## 2024-09-19 DIAGNOSIS — R91.8 GROUND GLASS OPACITY PRESENT ON IMAGING OF LUNG: ICD-10-CM

## 2024-09-19 PROCEDURE — 99213 OFFICE O/P EST LOW 20 MIN: CPT | Performed by: STUDENT IN AN ORGANIZED HEALTH CARE EDUCATION/TRAINING PROGRAM

## 2024-09-19 PROCEDURE — 3074F SYST BP LT 130 MM HG: CPT | Performed by: STUDENT IN AN ORGANIZED HEALTH CARE EDUCATION/TRAINING PROGRAM

## 2024-09-19 PROCEDURE — 99214 OFFICE O/P EST MOD 30 MIN: CPT | Performed by: STUDENT IN AN ORGANIZED HEALTH CARE EDUCATION/TRAINING PROGRAM

## 2024-09-19 PROCEDURE — 3079F DIAST BP 80-89 MM HG: CPT | Performed by: STUDENT IN AN ORGANIZED HEALTH CARE EDUCATION/TRAINING PROGRAM

## 2024-09-19 PROCEDURE — 71250 CT THORAX DX C-: CPT

## 2024-09-19 ASSESSMENT — FIBROSIS 4 INDEX: FIB4 SCORE: 0.88

## 2024-09-19 NOTE — PROGRESS NOTES
"Pulmonary Clinic- Initial Consult    Date of Service: 09/19/24    Referring Physician: No ref. provider found    Reason for Consult:     Chief Complaint:     HPI: \"This patient is a 39 y.o. male whom is followed in our clinic for respiratory failure and asthma last seen by me on 1/30/23.  PMHx is significant for childhood asthma and spastic cerebral palsy c/b spinal spasms for which he has a baclofen pump in place, narcolepsy and obstructive sleep apnea currently followed by our sleep clinic, hypothyroid and pericarditis diagnosed in 2015 after which patient has been dependent on supplemental O2 with no clear cause. He is a former tobacco smoker with roughly 10-pack-year history and quit 14 years ago.  He does use marijuana via vape pen for pain.  He is currently on disability therefore does not work.  He has good control of asthma on flovent 100 with almost no need for albuterol.  He cannot identify any specific triggers to his asthma and denies significant allergy symptoms.  Pulmonary function testing from October 2022 showed mildly reduced FEV1 prebronchodilator at 77% predicted with a normal ratio, significant bronchodilator response, normal total lung capacity with mild air trapping and elevated DLCO all consistent with reactive airways disease.  A chest x-ray from September 2022 was read as normal. No CT chest. TTE from 10/2021 showed normal LV systolic and diastolic function, normal RV size and function.  No evidence of intra atrial shunting.  Overall he is doing well. No asthma exacerbations. He was seen in February at [facility] with N/V. Lung bases were clear. He recently had spinal MRI with severe degenerative disease.\" From Dr. Jones's note 7/25/23.    Today, patient clarifies that he developed pericarditis in summer of 2015, was discharged with O2 and then had his baclofen pump placed October 2015. He has been on oxygen since that time. While wearing 2L with exertion he will drop to 92%. Drops to " low-mid 80s with exertion off of O2. Doesn't wear O2 with rest at home. He is symptomatic from asthma but only takes flovent maintenance inhaler ~2 days per week. Takes albuterol most days of the week and has wheezing/cough/chest tightness most days of week. Recent PFTs consistent with air trapping from asthma.      Past Medical History:   Diagnosis Date    ASTHMA     Back pain     Cerebral palsy (HCC)     Chickenpox     Daytime sleepiness     GERD (gastroesophageal reflux disease)     Hypothyroidism     Narcolepsy     Pain     cerebral palsy,    Rash     Rhinitis     Shortness of breath     Sleep apnea     Unspecified disorder of thyroid     Wheezing        Past Surgical History:   Procedure Laterality Date    PUMP INSERT/REMOVE Left 10/7/2015    Procedure: PUMP INSERT--Baclofen;  Surgeon: Yifan Sinclair;  Location: SURGERY [facility];  Service:     NEURO DEST FACET C/T W/IG SNGL Left 5/8/2015    Procedure: LEFT T11-12 FACET JOINT NERVE ABLATIONFLUORO, PRONE, RF PROTOCOL;  Surgeon: Gino Gan D.O.;  Location: SURGERY [facility];  Service: Pain Management    NEURO DEST FACET C/T W/IG ADDL  5/8/2015    Procedure: NEURO DEST FACET C/T W/IG ADDL;  Surgeon: Gino Gan D.O.;  Location: SURGERY [facility];  Service: Pain Management    NEURO DEST FACET L/S W/IG SNGL  5/8/2015    Procedure: NEURO DEST FACET L/S W/IG SNGL;  Surgeon: Gino Gan D.O.;  Location: SURGERY [facility];  Service: Pain Management       Social History     Socioeconomic History    Marital status: Single     Spouse name: Not on file    Number of children: Not on file    Years of education: Not on file    Highest education level: Not on file   Occupational History    Not on file   Tobacco Use    Smoking status: Former     Current packs/day: 0.00     Average packs/day: 1 pack/day for 14.0 years (14.0 ttl pk-yrs)     Types: Cigarettes     Start date: 1/1/1994     Quit date: 1/1/2008     Years since  quittin.7     Passive exposure: Past    Smokeless tobacco: Never   Vaping Use    Vaping status: Every Day    Substances: THC, CBD    Devices: Disposable, Pre-filled or refillable cartridge    Passive vaping exposure: Yes   Substance and Sexual Activity    Alcohol use: Not Currently     Alcohol/week: 1.8 oz     Types: 3 Standard drinks or equivalent per week    Drug use: Yes     Types: Marijuana, Inhaled     Comment: vape pen rather than smoking the medical marijuana    Sexual activity: Not on file   Other Topics Concern    Not on file   Social History Narrative    Not on file     Social Determinants of Health     Financial Resource Strain: Not on file   Food Insecurity: Not on file   Transportation Needs: Not on file   Physical Activity: Not on file   Stress: Not on file   Social Connections: Not on file   Intimate Partner Violence: Low Risk  (2023)    Received from [facility]    History of Abuse     History of Abuse: Denies   Housing Stability: Not on file          Family History   Problem Relation Age of Onset    Sleep Apnea Maternal Uncle        Current Outpatient Medications on File Prior to Visit   Medication Sig Dispense Refill    Armodafinil (NUVIGIL) 250 MG Tab Take 1 Tablet by mouth every morning for 30 days. Indications: Recurring Sleep Episodes During the Day 30 Tablet 0    amphetamine-dextroamphetamine (ADDERALL) 10 MG Tab Take 1 Tablet by mouth 3 times a day for 30 days. Indications: Recurring Sleep Episodes During the Day 90 Tablet 0    ketoconazole (NIZORAL) 2 % Cream AAA, approx. 0.5 to 1 gm, face, 1-2 times per day until resolved, then use 2-3 times per week for maintenance 30 g 1    clobetasol (TEMOVATE) 0.05 % external solution Use twice day for 1-2 weeks until resolved, then 2-3 times per week as needed 50 mL 3    anastrozole (ARIMIDEX) 1 MG Tab       baclofen (LIORESAL) 10 MG Tab TAKE 1 TABLET EVERY 8 HOURS AS NEEDED FOR WITHDRAWALS (TO BE USED IF BACLOFEN PAIN PUMP  FAILURE)      Lactated Ringers (LR) Solution       meloxicam (MOBIC) 15 MG tablet TAKE 1 TABLET BY MOUTH ONCE DAILY AS NEEDED FOR PAIN      ondansetron (ZOFRAN) 4 MG Tab tablet TAKE 1 TABLET BY MOUTH EVERY 8 HOURS AS NEEDED FOR NAUSEA      testosterone cypionate (DEPO-TESTOSTERONE) 200 MG/ML injection       fluticasone (FLOVENT HFA) 110 MCG/ACT Aerosol Inhale 2 Puffs 2 times a day. Use spacer. Rinse mouth after each use. 3 Each 3    albuterol (PROVENTIL) 2.5mg/3ml Nebu Soln solution for nebulization Take 3 mL by nebulization every four hours as needed for Shortness of Breath. 120 mL 11    albuterol (VENTOLIN HFA) 108 (90 Base) MCG/ACT Aero Soln inhalation aerosol Inhale 2 Puffs every four hours as needed for Shortness of Breath. 1 Each 11    oxyCODONE-acetaminophen (PERCOCET) 7.5-325 MG per tablet TAKE 1 ORAL TABLET FOUR TIMES A DAY. DNF UNTIL 01/18/2022- #120 FOR 30 DAYS      ondansetron (ZOFRAN ODT) 4 MG TABLET DISPERSIBLE Take 4 mg by mouth every 8 hours as needed for Nausea/Vomiting.      NON SPECIFIED Oxygen 2L N/C at noc      pantoprazole (PROTONIX) 40 MG Tablet Delayed Response Take 40 mg by mouth every day.      lidocaine (XYLOCAINE) 5 % Ointment Apply  to affected area(s) as needed.      testosterone (TESTIM,ANDROGEL) 50 MG/5GM (1%) GEL gel Apply 5 g to skin as directed 2 Times a Day.      levothyroxine (SYNTHROID) 100 MCG TABS Take 100 mcg by mouth Every morning on an empty stomach.      methocarbamol (ROBAXIN) 750 MG TABS Take 750 mg by mouth 3 times a day.      gabapentin (NEURONTIN) 300 MG CAPS Take 300 mg by mouth 3 times a day.      vitamin D, Ergocalciferol, (DRISDOL) 74375 UNITS CAPS capsule Take  by mouth every 7 days.       No current facility-administered medications on file prior to visit.       Allergies: Patient has no known allergies.      ROS:   Review of Systems   Respiratory:  Positive for cough, shortness of breath and wheezing.    Neurological:  Headaches: .vs.       Vitals:  /80  "(BP Location: Left arm, Patient Position: Sitting, BP Cuff Size: Adult)   Pulse (!) 106   Ht 1.727 m (5' 8\")   Wt 74.4 kg (164 lb)   SpO2 94%   BMI 24.94 kg/m²     94% RA    Physical Exam:    General - alert, oriented x4  HEENT - normocephalic, trachea midline  Cardiovascular - no JVD, RRR, s1, s2, RRR  Pulmonary - not in respiratory distress, CTAB, no wheezes, rhonchi or rales  Abdominal - soft, nondistended  Skin - hands appear normal, no rashes  Extremities - no lower extremity edema bilaterally  Psych - affect and mood appropriate    Objective Data:    Labs:  Reviewed    Pulmonary Function Tests:  PFTs as reviewed by me personally show:  5/7/24 repeat PFTs   FVC 4.67 94%  FEV1 3.44L 85%  FEV1/FVC 74%  MVV 93  TLC 6.94 106%  RV 2.18 128%  ERV 0.71 46%  DLCO 135    Imaging:  Imaging as reviewed by me personally show:    CT CHEST 9/2023 with some RUL scattered very faint GGO    Cardiac:  TTE 10/2021  Left Ventricle:   The left ventricle is normal in size, thickness and function. The Ejection Fraction estimate is 55-60%. No regional wall motion abnormalities noted. A variety of Doppler measurements indicate normal left ventricular diastolic function.   Right Ventricle:   The right ventricle is normal in size and function.   Atria:   The left atrial size is normal. The left atrial volume index is estimated to be 16-34 ml/m2. The right atrium is normal in size. There is no Doppler evidence for an interatrial shunt.   Mitral Valve:   The mitral valve is normal in structure and function. The mean pressure gradient is 1.5 mmHg. There is no mitral regurgitation noted.   Aortic Valve:   The aortic valve is trileaflet. The aortic valve opens well. No aortic regurgitation is present.   Tricuspid Valve:   The tricuspid is normal in structure and function. There is a trace or physiologic amount of tricuspid regurgitation. Doppler findings do not suggest pulmonary hypertension.   Pulmonic Valve:   The pulmonic valve is not " well seen, but is grossly normal. There is a trace or physiologic amount of pulmonic regurgitation.   Great Vessels:   The aortic root is normal size. The ascending aorta measures 32 mm in size. The abdominal aorta measures 20 mm in size. The IVC was normal in size at < 23 mm and collapsed >50% with respiration (RAP 3 mmHg).   Pericardium/Pleural:   There is no pericardial effusion. There is no pleural effusion.     Assessment/Plan:    1. Chronic hypoxemic respiratory failure (HCC)  CT-CHEST, HIGH RESOLUTION LUNG      2. Moderate persistent asthma without complication        3. Presence of intrathecal baclofen pump        4. Ground glass opacity present on imaging of lung          Unclear etiology. PFTs show suboptimally controlled asthma with air trapping but normal diffusion. No R-->L shunt present on TTE with bubble. No ILD on CT CHEST although there were faint GGOs in RUL 1 year ago. Repeat CT CHEST r/o organizing PNA or other interstitial processes. While the onset of his chronic hypoxemic resp failure began after his episode of pericarditis in summer/fall of 2017, he also received his baclofen pump soon after that in October 2017. ~2% of patients with intrathecal baclofen pumps develop hypoventilation per quick lit review and I wonder whether this could be etiology of resp failure. He already uses PAP for nocturnal JENNIFER. His baclofen pump has been so life changing that he would never consider going down on the dose and he is happy to continue to wear exertional O2.  Uptitrate daily scheduled ICS dose  As above  Repeat CT CHEST    Return in about 6 months (around 3/19/2025) for with an MD.     This note was generated using voice recognition software which has a chance of producing errors of grammar and possibly content.  I have made every reasonable attempt to find and correct any obvious errors, but it should be expected that some may not be found prior to finalization of this note.    Time spent in record  review prior to patient arrival, reviewing results, and in face-to-face encounter totaled 45 min, excluding any procedures if performed.  __________  Bertin Weeks MD  Pulmonary and Critical Care Medicine  [facility]

## 2024-09-20 PROBLEM — R91.8 GROUND GLASS OPACITY PRESENT ON IMAGING OF LUNG: Status: ACTIVE | Noted: 2024-09-20

## 2024-09-20 PROBLEM — Z97.8 PRESENCE OF INTRATHECAL BACLOFEN PUMP: Status: ACTIVE | Noted: 2024-09-20

## 2024-09-20 PROBLEM — J96.11 CHRONIC HYPOXIC RESPIRATORY FAILURE (HCC): Status: ACTIVE | Noted: 2024-09-20

## 2024-09-20 PROBLEM — J45.40 MODERATE PERSISTENT ASTHMA: Status: ACTIVE | Noted: 2024-09-20

## 2024-09-20 RX ORDER — FLUTICASONE PROPIONATE 220 UG/1
2 AEROSOL, METERED RESPIRATORY (INHALATION) 2 TIMES DAILY
Qty: 12 G | Refills: 11 | Status: SHIPPED | OUTPATIENT
Start: 2024-09-20

## 2024-09-20 ASSESSMENT — ENCOUNTER SYMPTOMS
SHORTNESS OF BREATH: 1
WHEEZING: 1
COUGH: 1

## 2024-10-11 ENCOUNTER — PATIENT MESSAGE (OUTPATIENT)
Dept: SLEEP MEDICINE | Facility: MEDICAL CENTER | Age: 39
End: 2024-10-11
Payer: MEDICARE

## 2024-10-11 DIAGNOSIS — J45.40 MODERATE PERSISTENT ASTHMA WITHOUT COMPLICATION: ICD-10-CM

## 2024-10-11 DIAGNOSIS — G47.419 NARCOLEPSY WITHOUT CATAPLEXY: ICD-10-CM

## 2024-10-11 DIAGNOSIS — G47.19 EXCESSIVE DAYTIME SLEEPINESS: ICD-10-CM

## 2024-10-11 RX ORDER — ALBUTEROL SULFATE 90 UG/1
2 INHALANT RESPIRATORY (INHALATION) EVERY 4 HOURS PRN
Qty: 1 EACH | Refills: 11 | Status: SHIPPED | OUTPATIENT
Start: 2024-10-11

## 2024-10-11 RX ORDER — DEXTROAMPHETAMINE SACCHARATE, AMPHETAMINE ASPARTATE, DEXTROAMPHETAMINE SULFATE AND AMPHETAMINE SULFATE 2.5; 2.5; 2.5; 2.5 MG/1; MG/1; MG/1; MG/1
10 TABLET ORAL 3 TIMES DAILY
Qty: 90 TABLET | Refills: 0 | Status: SHIPPED | OUTPATIENT
Start: 2024-10-11 | End: 2024-11-10

## 2024-10-11 RX ORDER — ARMODAFINIL 250 MG/1
1 TABLET ORAL EVERY MORNING
Qty: 30 TABLET | Refills: 0 | Status: SHIPPED | OUTPATIENT
Start: 2024-10-11 | End: 2024-11-10

## 2024-10-14 RX ORDER — DEXTROAMPHETAMINE SACCHARATE, AMPHETAMINE ASPARTATE, DEXTROAMPHETAMINE SULFATE AND AMPHETAMINE SULFATE 2.5; 2.5; 2.5; 2.5 MG/1; MG/1; MG/1; MG/1
10 TABLET ORAL 3 TIMES DAILY
Qty: 90 TABLET | Refills: 0 | OUTPATIENT
Start: 2024-10-14 | End: 2024-11-13

## 2024-10-14 RX ORDER — ARMODAFINIL 250 MG/1
1 TABLET ORAL EVERY MORNING
Qty: 30 TABLET | Refills: 0 | OUTPATIENT
Start: 2024-10-14 | End: 2024-11-13

## 2024-11-08 DIAGNOSIS — G47.419 NARCOLEPSY WITHOUT CATAPLEXY: ICD-10-CM

## 2024-11-08 DIAGNOSIS — G47.19 EXCESSIVE DAYTIME SLEEPINESS: ICD-10-CM

## 2024-11-11 ENCOUNTER — APPOINTMENT (OUTPATIENT)
Dept: URGENT CARE | Facility: CLINIC | Age: 39
End: 2024-11-11
Payer: MEDICARE

## 2024-11-13 RX ORDER — DEXTROAMPHETAMINE SACCHARATE, AMPHETAMINE ASPARTATE, DEXTROAMPHETAMINE SULFATE AND AMPHETAMINE SULFATE 2.5; 2.5; 2.5; 2.5 MG/1; MG/1; MG/1; MG/1
10 TABLET ORAL 3 TIMES DAILY
Qty: 90 TABLET | Refills: 0 | Status: SHIPPED | OUTPATIENT
Start: 2024-11-13 | End: 2024-12-13

## 2024-11-13 RX ORDER — ARMODAFINIL 250 MG/1
1 TABLET ORAL EVERY MORNING
Qty: 30 TABLET | Refills: 0 | Status: SHIPPED | OUTPATIENT
Start: 2024-11-13 | End: 2024-12-13

## 2024-11-13 NOTE — TELEPHONE ENCOUNTER
Have we ever prescribed this med? Yes.  If yes, what date? 10.11.24    Last OV: 9.17.24    Next OV: 3.17.25    DX: JENNIFER     Medications: Armodafinil (NUVIGIL) 250 MG Tab      Have we ever prescribed this med? Yes.  If yes, what date? 10.11.24    Last OV: 3.17.25    Next OV: 9.17.24    DX: JENNIFER     Medications: amphetamine-dextroamphetamine (ADDERALL) 10 MG Tab

## 2024-12-09 ENCOUNTER — PATIENT MESSAGE (OUTPATIENT)
Dept: SLEEP MEDICINE | Facility: MEDICAL CENTER | Age: 39
End: 2024-12-09
Payer: MEDICARE

## 2025-01-08 DIAGNOSIS — G47.19 EXCESSIVE DAYTIME SLEEPINESS: ICD-10-CM

## 2025-01-08 DIAGNOSIS — G47.419 NARCOLEPSY WITHOUT CATAPLEXY: ICD-10-CM

## 2025-01-08 NOTE — PATIENT COMMUNICATION
Have we ever prescribed this med? Yes.  If yes, what date? 12.10.24    Last OV: 9.17.24    Next OV: 3.17.25    DX: JENNIFER    Medications: amphetamine-dextroamphetamine (ADDERALL) 10 MG Tab        Have we ever prescribed this med? Yes.  If yes, what date? 12.10.24    Last OV: 9.17.24    Next OV: 3.17.25    DX: JENNIFER    Medications: Armodafinil (NUVIGIL) 250 MG Tab

## 2025-01-09 RX ORDER — DEXTROAMPHETAMINE SACCHARATE, AMPHETAMINE ASPARTATE, DEXTROAMPHETAMINE SULFATE AND AMPHETAMINE SULFATE 2.5; 2.5; 2.5; 2.5 MG/1; MG/1; MG/1; MG/1
10 TABLET ORAL 3 TIMES DAILY
Qty: 90 TABLET | Refills: 0 | Status: SHIPPED | OUTPATIENT
Start: 2025-01-09 | End: 2025-02-08

## 2025-01-09 RX ORDER — ARMODAFINIL 250 MG/1
1 TABLET ORAL EVERY MORNING
Qty: 30 TABLET | Refills: 0 | Status: SHIPPED | OUTPATIENT
Start: 2025-01-09 | End: 2025-02-08

## 2025-02-11 DIAGNOSIS — G47.419 NARCOLEPSY WITHOUT CATAPLEXY: ICD-10-CM

## 2025-02-11 DIAGNOSIS — G47.19 EXCESSIVE DAYTIME SLEEPINESS: ICD-10-CM

## 2025-02-11 RX ORDER — ARMODAFINIL 250 MG/1
1 TABLET ORAL EVERY MORNING
Qty: 30 TABLET | Refills: 0 | Status: SHIPPED | OUTPATIENT
Start: 2025-02-11 | End: 2025-03-13

## 2025-02-11 RX ORDER — DEXTROAMPHETAMINE SACCHARATE, AMPHETAMINE ASPARTATE, DEXTROAMPHETAMINE SULFATE AND AMPHETAMINE SULFATE 2.5; 2.5; 2.5; 2.5 MG/1; MG/1; MG/1; MG/1
10 TABLET ORAL 3 TIMES DAILY
Qty: 90 TABLET | Refills: 0 | Status: SHIPPED | OUTPATIENT
Start: 2025-02-11 | End: 2025-03-13

## 2025-02-11 NOTE — TELEPHONE ENCOUNTER
Have we ever prescribed this med? Yes.  If yes, what date? 1.9.25    Last OV: 9.17.24    Next OV: 3.17.25    DX: JENNIFER    Medications: Armodafinil (NUVIGIL) 250 MG Tab     Have we ever prescribed this med? Yes.  If yes, what date? 1.9.25    Last OV: 9.17.24    Next OV: 3.17.25    DX: JENNIFER    Medications: amphetamine-dextroamphetamine (ADDERALL) 10 MG Tab

## 2025-03-17 ENCOUNTER — OFFICE VISIT (OUTPATIENT)
Dept: SLEEP MEDICINE | Facility: MEDICAL CENTER | Age: 40
End: 2025-03-17
Attending: STUDENT IN AN ORGANIZED HEALTH CARE EDUCATION/TRAINING PROGRAM
Payer: MEDICARE

## 2025-03-17 VITALS
HEART RATE: 90 BPM | BODY MASS INDEX: 27.58 KG/M2 | SYSTOLIC BLOOD PRESSURE: 120 MMHG | DIASTOLIC BLOOD PRESSURE: 74 MMHG | WEIGHT: 182 LBS | HEIGHT: 68 IN | OXYGEN SATURATION: 98 % | RESPIRATION RATE: 16 BRPM

## 2025-03-17 DIAGNOSIS — G47.419 NARCOLEPSY WITHOUT CATAPLEXY: Primary | ICD-10-CM

## 2025-03-17 DIAGNOSIS — G47.19 EXCESSIVE DAYTIME SLEEPINESS: ICD-10-CM

## 2025-03-17 DIAGNOSIS — G47.33 OSA (OBSTRUCTIVE SLEEP APNEA): ICD-10-CM

## 2025-03-17 PROCEDURE — 99214 OFFICE O/P EST MOD 30 MIN: CPT | Performed by: STUDENT IN AN ORGANIZED HEALTH CARE EDUCATION/TRAINING PROGRAM

## 2025-03-17 PROCEDURE — 99213 OFFICE O/P EST LOW 20 MIN: CPT | Performed by: STUDENT IN AN ORGANIZED HEALTH CARE EDUCATION/TRAINING PROGRAM

## 2025-03-17 PROCEDURE — 3074F SYST BP LT 130 MM HG: CPT | Performed by: STUDENT IN AN ORGANIZED HEALTH CARE EDUCATION/TRAINING PROGRAM

## 2025-03-17 PROCEDURE — 3078F DIAST BP <80 MM HG: CPT | Performed by: STUDENT IN AN ORGANIZED HEALTH CARE EDUCATION/TRAINING PROGRAM

## 2025-03-17 RX ORDER — METHYLPHENIDATE HYDROCHLORIDE 10 MG/1
10 TABLET ORAL 3 TIMES DAILY
Qty: 90 EACH | Refills: 0 | Status: SHIPPED | OUTPATIENT
Start: 2025-03-17 | End: 2025-04-16

## 2025-03-17 ASSESSMENT — PATIENT HEALTH QUESTIONNAIRE - PHQ9: CLINICAL INTERPRETATION OF PHQ2 SCORE: 0

## 2025-03-17 NOTE — PROGRESS NOTES
[facility] Follow-up Visit    CC: Discuss alternative medications to Adderall for his narcolepsy      HPI:  Donato Valle is a 39 y.o.male  with GERD, chronic pain, cerebral palsy, chronic respiratory failure on supplemental oxygen 2 additional minute, narcolepsy type II, and obstructive sleep apnea on CPAP presents today to follow-up regarding management of narcolepsy type II.    He has been having trouble getting his Adderall filled due to national shortage.  He would like to see if he can use a different short acting stimulant to help his daytime sleepiness.  He was taking Adderall 10 mg 3 times a day.  Continues to take armodafinil 250 mg once a day.  He has tried Sunosi and modafinil with less benefit in the past.    He states about 5 days out of the week he gets quality sleep.  2 days a week he will have difficulty with prolonged sleep time and waking up and feeling as though he is missed most of the day.  He finds this very frustrating.  He states he would like to build to sleep the same every day and wake up the same time every day.  He does see Dr. Singleton for cognitive behavioral therapy.    He states he is using his CPAP and does not have any concerns regarding his PAP machine at this time.    DME provider: Gunnar Rutledge  Device: airsense 11  Mask: fullface   Aerophagia: No   Snoring: No   Dry mouth: Yes  Leak: No   Skin irritation: No   Chin strap: No      Sleep History  Previously diagnosed outside facility Dr. Townsend's office around 2015 2016 which showed narcolepsy without cataplexy.  3/22/2022 PSG showed mild obstructive sleep apnea overall AHI of 5.6, minimum oxygen saturation 86%, time at or below 88% saturation of 18.7 minutes.  Sleep onset REM period was seen during PSG  3/23/2022 MSLT showed mean sleep latency of 2.2 minutes and 3 sleep onset REM periods were identified consistent with a diagnosis of narcolepsy.    Patient Active Problem List    Diagnosis Date Noted    Chronic  hypoxic respiratory failure (HCC) 09/20/2024    Moderate persistent asthma 09/20/2024    Presence of intrathecal baclofen pump 09/20/2024    Ground glass opacity present on imaging of lung 09/20/2024    Chronic pericarditis 02/15/2022    Gastroesophageal reflux 02/15/2022    Spastic cerebral palsy (HCC) 02/15/2022    Chronic pain 10/07/2015    Lumbosacral spondylosis without myelopathy 05/08/2015       Past Medical History:   Diagnosis Date    ASTHMA     Back pain     Cerebral palsy (HCC)     Chickenpox     Daytime sleepiness     GERD (gastroesophageal reflux disease)     Hypothyroidism     Narcolepsy     Pain     cerebral palsy,    Rash     Rhinitis     Shortness of breath     Sleep apnea     Unspecified disorder of thyroid     Wheezing         Past Surgical History:   Procedure Laterality Date    PUMP INSERT/REMOVE Left 10/7/2015    Procedure: PUMP INSERT--Baclofen;  Surgeon: Yifan Sinclair;  Location: [facility];  Service:     NEURO DEST FACET C/T W/IG SNGL Left 5/8/2015    Procedure: LEFT T11-12 FACET JOINT NERVE ABLATIONFLUORO, PRONE, RF PROTOCOL;  Surgeon: Gino Gan D.O.;  Location: [facility];  Service: Pain Management    NEURO DEST FACET C/T W/IG ADDL  5/8/2015    Procedure: NEURO DEST FACET C/T W/IG ADDL;  Surgeon: Gino Gan D.O.;  Location: [facility];  Service: Pain Management    NEURO DEST FACET L/S W/IG SNGL  5/8/2015    Procedure: NEURO DEST FACET L/S W/IG SNGL;  Surgeon: Gino Gan D.O.;  Location: [facility];  Service: Pain Management       Family History   Problem Relation Age of Onset    Sleep Apnea Maternal Uncle        Social History     Socioeconomic History    Marital status: Single     Spouse name: Not on file    Number of children: Not on file    Years of education: Not on file    Highest education level: Not on file   Occupational History    Not on file   Tobacco Use    Smoking status: Former     Current  packs/day: 0.00     Average packs/day: 1 pack/day for 14.0 years (14.0 ttl pk-yrs)     Types: Cigarettes     Start date: 1994     Quit date: 2008     Years since quittin.2     Passive exposure: Past    Smokeless tobacco: Never   Vaping Use    Vaping status: Every Day    Substances: THC, CBD    Devices: Disposable, Pre-filled or refillable cartridge    Passive vaping exposure: Yes   Substance and Sexual Activity    Alcohol use: Not Currently     Alcohol/week: 1.8 oz     Types: 3 Standard drinks or equivalent per week    Drug use: Yes     Types: Marijuana, Inhaled     Comment: vape pen rather than smoking the medical marijuana    Sexual activity: Not on file   Other Topics Concern    Not on file   Social History Narrative    Not on file     Social Drivers of Health     Financial Resource Strain: Not on file   Food Insecurity: Not on file   Transportation Needs: Not on file   Physical Activity: Not on file   Stress: Not on file   Social Connections: Not on file   Intimate Partner Violence: Low Risk  (2023)    Received from [facility]    History of Abuse     History of Abuse: Denies   Housing Stability: Not on file       Current Outpatient Medications   Medication Sig Dispense Refill    methylphenidate (RITALIN) 10 MG Tab Take 1 Tablet by mouth 3 times a day for 30 days. Indications: Recurring Sleep Episodes During the Day 90 Each 0    Armodafinil (NUVIGIL) 250 MG Tab Take 1 Tablet by mouth every morning for 30 days. Indications: Recurring Sleep Episodes During the Day 30 Tablet 0    albuterol (VENTOLIN HFA) 108 (90 Base) MCG/ACT Aero Soln inhalation aerosol Inhale 2 Puffs every four hours as needed for Shortness of Breath. 1 Each 11    fluticasone (FLOVENT HFA) 220 MCG/ACT Aerosol Inhale 2 Puffs 2 times a day. Use spacer. Rinse mouth after each use. 12 g 11    ketoconazole (NIZORAL) 2 % Cream AAA, approx. 0.5 to 1 gm, face, 1-2 times per day until resolved, then use 2-3 times per week  for maintenance 30 g 1    clobetasol (TEMOVATE) 0.05 % external solution Use twice day for 1-2 weeks until resolved, then 2-3 times per week as needed 50 mL 3    anastrozole (ARIMIDEX) 1 MG Tab       baclofen (LIORESAL) 10 MG Tab TAKE 1 TABLET EVERY 8 HOURS AS NEEDED FOR WITHDRAWALS (TO BE USED IF BACLOFEN PAIN PUMP FAILURE)      Lactated Ringers (LR) Solution       meloxicam (MOBIC) 15 MG tablet TAKE 1 TABLET BY MOUTH ONCE DAILY AS NEEDED FOR PAIN      ondansetron (ZOFRAN) 4 MG Tab tablet TAKE 1 TABLET BY MOUTH EVERY 8 HOURS AS NEEDED FOR NAUSEA      testosterone cypionate (DEPO-TESTOSTERONE) 200 MG/ML injection       albuterol (PROVENTIL) 2.5mg/3ml Nebu Soln solution for nebulization Take 3 mL by nebulization every four hours as needed for Shortness of Breath. 120 mL 11    oxyCODONE-acetaminophen (PERCOCET) 7.5-325 MG per tablet TAKE 1 ORAL TABLET FOUR TIMES A DAY. DNF UNTIL 01/18/2022- #120 FOR 30 DAYS      ondansetron (ZOFRAN ODT) 4 MG TABLET DISPERSIBLE Take 4 mg by mouth every 8 hours as needed for Nausea/Vomiting.      NON SPECIFIED Oxygen 2L N/C at noc      pantoprazole (PROTONIX) 40 MG Tablet Delayed Response Take 40 mg by mouth every day.      lidocaine (XYLOCAINE) 5 % Ointment Apply  to affected area(s) as needed.      testosterone (TESTIM,ANDROGEL) 50 MG/5GM (1%) GEL gel Apply 5 g to skin as directed 2 Times a Day.      levothyroxine (SYNTHROID) 100 MCG TABS Take 100 mcg by mouth Every morning on an empty stomach.      methocarbamol (ROBAXIN) 750 MG TABS Take 750 mg by mouth 3 times a day.      gabapentin (NEURONTIN) 300 MG CAPS Take 300 mg by mouth 3 times a day.      vitamin D, Ergocalciferol, (DRISDOL) 83433 UNITS CAPS capsule Take  by mouth every 7 days.       No current facility-administered medications for this visit.     ALLERGIES: Patient has no known allergies.    ROS  Constitutional: Denies fevers, Denies weight changes  Ears/Nose/Throat/Mouth: Denies nasal congestion or sore throat  "  Cardiovascular: Denies chest pain  Respiratory: Denies shortness of breath, Denies cough  Gastrointestinal/Hepatic: Denies nausea, vomiting  Sleep: see HPI      PHYSICAL EXAM  /74 (BP Location: Left arm, Patient Position: Sitting, BP Cuff Size: Adult)   Pulse 90   Resp 16   Ht 1.727 m (5' 8\")   Wt 82.6 kg (182 lb)   SpO2 98%   BMI 27.67 kg/m²   Appearance: Well-nourished, well-developed, no acute distress  Eyes:  No scleral icterus , EOMI  Musculoskeletal:  Grossly normal; gait and station normal; digits and nails normal  Skin:  No rashes, petechiae, cyanosis  Neurologic: without focal signs; oriented to person, time, place, and purpose; judgement intact      Medical Decision Making   Assessment and Plan  Donato Valle is a 39 y.o.male  with GERD, chronic pain, cerebral palsy, chronic respiratory failure on supplemental oxygen 2 additional minute, narcolepsy type II, and obstructive sleep apnea on CPAP presents today to follow-up regarding management of narcolepsy type II.    The medical record was reviewed.    Narcolepsy type II  Patient continues to use and benefit from armodafinil 250 mg.  He has been having difficulty getting his Adderall 10 mg.  He uses this as needed throughout the day up to 3 times a day to help with sleepiness.  Given the difficulty with getting Adderall will prescribe methylphenidate 10 mg 3 times a day.  Advised to avoid driving if drowsy.    Plan  -Continue armodafinil 250 mg daily  -Start methylphenidate in place of Adderall 10 mg 3 times a day  -Advised to reach out MyChart with any concerns or questions.      Obstructive sleep apnea  Compliance data reviewed showing 63% usage > 4hours in last 30  days. Average AHI 4.2 events/hour. Pt continues to use and benefit from machine.      Current Settings auto CPAP 4-8    PLAN:   -Continue CPAP therapy  -Advised to reach out via MyChart with questions     Frustration around sleep  Discussed that his current sleep pattern " is an improvement from his baseline.  Patient estimates that 2 days a week he is not sleeping on the schedule he would like.  He finds this very frustrating.  He is seeing psychology and he is working on frustration.  Discussed importance of trying to reframe his thoughts around sleep.  Advised to focus on the positive versus the negative effects of his prolonged sleep periods 2 days a week.  Recommended to continue to try to keep same bedtime and wake time every day.     Return in about 6 months (around 9/17/2025).    Total time care for the patient this date was 33 minutes. Time spent includes chart review, lab review, discussion with myself.     Please note portions of this record was created using voice recognition software. I have made every reasonable attempt to correct obvious errors, but I expect that there are errors of grammar and possibly content I did not discover before finalizing the note.

## 2025-07-09 DIAGNOSIS — G47.419 NARCOLEPSY WITHOUT CATAPLEXY: ICD-10-CM

## 2025-07-09 DIAGNOSIS — G47.19 EXCESSIVE DAYTIME SLEEPINESS: ICD-10-CM

## 2025-07-10 RX ORDER — DEXTROAMPHETAMINE SACCHARATE, AMPHETAMINE ASPARTATE, DEXTROAMPHETAMINE SULFATE AND AMPHETAMINE SULFATE 2.5; 2.5; 2.5; 2.5 MG/1; MG/1; MG/1; MG/1
10 TABLET ORAL 3 TIMES DAILY
Qty: 90 TABLET | Refills: 0 | Status: SHIPPED | OUTPATIENT
Start: 2025-07-10 | End: 2025-08-09

## 2025-07-10 RX ORDER — ARMODAFINIL 250 MG/1
1 TABLET ORAL DAILY
Qty: 30 TABLET | Refills: 0 | Status: SHIPPED | OUTPATIENT
Start: 2025-07-10 | End: 2025-08-09

## 2025-07-10 NOTE — TELEPHONE ENCOUNTER
Have we ever prescribed this med? Yes.  If yes, what date? 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 Yes.  If yes, what date? 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

## 2025-07-14 DIAGNOSIS — J45.40 MODERATE PERSISTENT ASTHMA WITHOUT COMPLICATION: ICD-10-CM

## 2025-07-14 RX ORDER — FLUTICASONE PROPIONATE 220 UG/1
2 AEROSOL, METERED RESPIRATORY (INHALATION) 2 TIMES DAILY
Qty: 12 G | Refills: 11 | Status: SHIPPED | OUTPATIENT
Start: 2025-07-14

## 2025-07-17 ENCOUNTER — OFFICE VISIT (OUTPATIENT)
Dept: DERMATOLOGY | Facility: IMAGING CENTER | Age: 40
End: 2025-07-17
Payer: MEDICARE

## 2025-07-17 DIAGNOSIS — L73.9 FOLLICULITIS: Primary | ICD-10-CM

## 2025-07-17 DIAGNOSIS — L21.9 SEBORRHEIC DERMATITIS: ICD-10-CM

## 2025-07-17 PROCEDURE — 99213 OFFICE O/P EST LOW 20 MIN: CPT | Performed by: NURSE PRACTITIONER

## 2025-07-17 RX ORDER — CLINDAMYCIN PHOSPHATE 11.9 MG/ML
SOLUTION TOPICAL
Qty: 60 ML | Refills: 3 | Status: SHIPPED | OUTPATIENT
Start: 2025-07-17

## 2025-07-17 RX ORDER — DOXYCYCLINE HYCLATE 100 MG
100 TABLET ORAL 2 TIMES DAILY
Qty: 28 TABLET | Refills: 0 | Status: SHIPPED | OUTPATIENT
Start: 2025-07-17

## 2025-07-17 RX ORDER — KETOCONAZOLE 20 MG/G
CREAM TOPICAL
Qty: 60 G | Refills: 3 | Status: SHIPPED | OUTPATIENT
Start: 2025-07-17

## 2025-07-17 RX ORDER — CLOBETASOL PROPIONATE 0.5 MG/ML
SOLUTION TOPICAL
Qty: 50 ML | Refills: 3 | Status: SHIPPED | OUTPATIENT
Start: 2025-07-17

## 2025-07-17 NOTE — PROGRESS NOTES
DERMATOLOGY NOTE  NEW VISIT       Chief complaint: Rash and Follow-Up     Est patient here following up on redness on skin has not improved, has flared up on arms.    PREV VISIT:  Itchy, Red, dry patches on scalp when shave (haircut)  Also dry skin and red around nose. Pt has used Ketoconazole and did not help  Some days are worst than others        No Known Allergies     MEDICATIONS:  Medications relevant to specialty reviewed.     REVIEW OF SYSTEMS:   Positive for skin (see HPI)  Negative for fevers and chills       EXAM:  There were no vitals taken for this visit.  Constitutional: Well-developed, well-nourished, and in no distress.     A focused skin exam was performed including the affected areas of the head (including face). Notable findings on exam today listed below and/or in assessment/plan.     Localized erythema with flake and scalp to R parietal scalp and central face  Very few scattered erythematous papules in varying stages of healing to upper extremities and trunk    IMPRESSION / PLAN:    1. Seborrheic dermatitis  Chronic, suboptimally controlled  Requesting refills on Rx's below  Follow up 4 months  - ketoconazole (NIZORAL) 2 % Cream; AAA twice a day for 1-2 weeks, then use 2-3 times per week  Dispense: 60 g; Refill: 3  - clobetasol (TEMOVATE) 0.05 % external solution; Use twice day for 1-2 weeks until resolved, then 2-3 times per week as needed  Dispense: 50 mL; Refill: 3    2. Folliculitis, favored  Discussed course/nature of disease  Pt does shave, discussed likely contributing to folliculitis  Discussed use of hibiclens  Rx's below  Follow up 4 months  - doxycycline (VIBRAMYCIN) 100 MG Tab; Take 1 Tablet by mouth 2 times a day.  Dispense: 28 Tablet; Refill: 0  - clindamycin (CLEOCIN) 1 % Solution; AAA twice a day for 1-2 weeks then can use 2-3 times per week for maintenance  Dispense: 60 mL; Refill: 3        Discussed risks, benefits, alternative treatments as well as common side effects  associated with prescribed treatment, Patient verbalized understanding and agrees with plan regarding the above          Please note that this dictation was created using voice recognition software. I have made every reasonable attempt to correct obvious errors, but I expect that there are errors of grammar and possibly content that I did not discover before finalizing the note.      Return to clinic in: Return in about 4 months (around 11/17/2025) for Rash follow up. and as needed for any new or changing skin lesions.

## 2025-08-13 DIAGNOSIS — G47.19 EXCESSIVE DAYTIME SLEEPINESS: ICD-10-CM

## 2025-08-13 DIAGNOSIS — G47.419 NARCOLEPSY WITHOUT CATAPLEXY: ICD-10-CM

## 2025-08-14 RX ORDER — ARMODAFINIL 250 MG/1
1 TABLET ORAL DAILY
Qty: 30 TABLET | Refills: 0 | Status: SHIPPED | OUTPATIENT
Start: 2025-08-14 | End: 2025-09-13

## 2025-08-14 RX ORDER — DEXTROAMPHETAMINE SACCHARATE, AMPHETAMINE ASPARTATE, DEXTROAMPHETAMINE SULFATE AND AMPHETAMINE SULFATE 2.5; 2.5; 2.5; 2.5 MG/1; MG/1; MG/1; MG/1
10 TABLET ORAL 3 TIMES DAILY
Qty: 90 TABLET | Refills: 0 | Status: SHIPPED | OUTPATIENT
Start: 2025-08-14 | End: 2025-09-13